# Patient Record
Sex: FEMALE | Race: WHITE | NOT HISPANIC OR LATINO | Employment: FULL TIME | URBAN - METROPOLITAN AREA
[De-identification: names, ages, dates, MRNs, and addresses within clinical notes are randomized per-mention and may not be internally consistent; named-entity substitution may affect disease eponyms.]

---
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
## 2024-01-09 ENCOUNTER — HOSPITAL ENCOUNTER (OUTPATIENT)
Facility: HOSPITAL | Age: 19
Discharge: HOME | End: 2024-01-09
Attending: STUDENT IN AN ORGANIZED HEALTH CARE EDUCATION/TRAINING PROGRAM | Admitting: OBSTETRICS & GYNECOLOGY
Payer: MEDICAID

## 2024-01-09 VITALS — DIASTOLIC BLOOD PRESSURE: 99 MMHG | RESPIRATION RATE: 16 BRPM | HEART RATE: 95 BPM | SYSTOLIC BLOOD PRESSURE: 110 MMHG

## 2024-01-09 LAB
BACTERIA UR QL AUTO: ABNORMAL /HPF
BILIRUB UR QL STRIP: NEGATIVE
CLARITY UR: CLEAR
COLOR UR: YELLOW
GLUCOSE UR STRIP-MCNC: NEGATIVE MG/DL
HGB UR QL STRIP.AUTO: NEGATIVE
HYALINE CASTS UR QL AUTO: ABNORMAL /LPF
KETONES UR QL STRIP: NEGATIVE
LEUKOCYTE ESTERASE UR QL STRIP.AUTO: ABNORMAL
NITRITE UR QL STRIP: NEGATIVE
PH UR STRIP.AUTO: 7 [PH] (ref 5–8)
PROT UR QL STRIP: NEGATIVE
RBC # UR STRIP: ABNORMAL /HPF
REF LAB TEST METHOD: ABNORMAL
SP GR UR STRIP: <=1.005 (ref 1–1.03)
SQUAMOUS #/AREA URNS HPF: ABNORMAL /HPF
UROBILINOGEN UR QL STRIP: ABNORMAL
WBC # UR STRIP: ABNORMAL /HPF

## 2024-01-09 PROCEDURE — G0463 HOSPITAL OUTPT CLINIC VISIT: HCPCS

## 2024-01-09 PROCEDURE — 81001 URINALYSIS AUTO W/SCOPE: CPT | Performed by: OBSTETRICS & GYNECOLOGY

## 2024-01-09 RX ORDER — PRENATAL WITH FERROUS FUM AND FOLIC ACID 3080; 920; 120; 400; 22; 1.84; 3; 20; 10; 1; 12; 200; 27; 25; 2 [IU]/1; [IU]/1; MG/1; [IU]/1; MG/1; MG/1; MG/1; MG/1; MG/1; MG/1; UG/1; MG/1; MG/1; MG/1; MG/1
1 TABLET ORAL DAILY
COMMUNITY

## 2024-01-09 NOTE — H&P
"[facility]  Obstetric History and Physical    Chief Complaint   Patient presents with    Back Pain     HPI:      Patient is a 18 y.o. female  currently at 31w4d, who presents by EMS with lower back pain that started while she was at work tonight at [employer].   She thinks it was the way she was sitting, but the other girls at work \"freaked her out\" so she thought she should be seen.  She denies feeling contractions, denies vaginal leaking and bleeding.  +FM.   Denies N/V/F/C.   No urinary symptoms.         The following portions of the patients history were reviewed and updated as appropriate: current medications, allergies, past medical history, past surgical history, past family history, past social history and problem list .       Prenatal Information:   Maternal Prenatal Labs  Blood Type No results found for: \"ABO\"   Rh Status No results found for: \"RH\"   Antibody Screen No results found for: \"ABSCRN\"   Gonnorhea No results found for: \"GCCX\"   Chlamydia No results found for: \"CLAMYDCU\"   RPR No results found for: \"RPR\"   Syphilis Antibody No results found for: \"SYPHILIS\"   Rubella No results found for: \"RUBELLAIGGIN\"   Hepatitis B Surface Antigen No results found for: \"HEPBSAG\"   HIV-1 Antibody No results found for: \"LABHIV1\"   Hepatitis C Antibody No results found for: \"HEPCAB\"   Rapid Urin Drug Screen No results found for: \"AMPMETHU\", \"BARBITSCNUR\", \"LABBENZSCN\", \"LABMETHSCN\", \"LABOPIASCN\", \"THCURSCR\", \"COCAINEUR\", \"AMPHETSCREEN\", \"PROPOXSCN\", \"BUPRENORSCNU\", \"METAMPSCNUR\", \"OXYCODONESCN\", \"TRICYCLICSCN\"   Group B Strep Culture No results found for: \"GBSANTIGEN\"           External Prenatal Results       Pregnancy Outside Results - Transcribed From Office Records - See Scanned Records For Details       Test Value Date Time    ABO  O  23 1521    Rh  Positive  23 1521    Antibody Screen  Negative  23 1111       Negative  23 1521    Varicella IgG  188 index 23 1521    Rubella  " 2.39 index 23 1521    Hgb  12.1 g/dL 23 1111       13.0 g/dL 23 1521    Hct  35.9 % 23 1111       38.6 % 23 1521    Glucose Fasting GTT       Glucose Tolerance Test 1 hour       Glucose Tolerance Test 3 hour       Gonorrhea (discrete)       Chlamydia (discrete)       RPR       VDRL       Syphilis Antibody       HBsAg  Non-Reactive  23 1521    Herpes Simplex Virus PCR       Herpes Simplex VIrus Culture       HIV  Non-Reactive  23 1521    Hep C RNA Quant PCR       Hep C Antibody  Non-Reactive  23 1521    AFP       Group B Strep       GBS Susceptibility to Clindamycin       GBS Susceptibility to Erythromycin       Fetal Fibronectin       Genetic Testing, Maternal Blood                 Drug Screening       Test Value Date Time    Urine Drug Screen       Amphetamine Screen  Negative  23 1521    Barbiturate Screen  Negative  23 1521    Benzodiazepine Screen  Negative  23 1521    Methadone Screen  Negative  23 1521    Phencyclidine Screen  Negative  23 1521    Opiates Screen  Negative  23 1521    THC Screen  Negative  23 1521    Cocaine Screen       Propoxyphene Screen  Negative  23 1521    Buprenorphine Screen  Negative  23 1521    Methamphetamine Screen       Oxycodone Screen  Negative  23 1521    Tricyclic Antidepressants Screen  Negative  23 1521              Legend    ^: Historical                              Past OB History:     OB History    Para Term  AB Living   3 0 0 0 1 0   SAB IAB Ectopic Molar Multiple Live Births   1 0 0 0 0 0      # Outcome Date GA Lbr Frank/2nd Weight Sex Delivery Anes PTL Lv   3 Current            2 2022 4w0d          1                 Past Medical History: Past Medical History:   Diagnosis Date    Anxiety     Depression     Polycystic kidney disease       Past Surgical History History reviewed. No pertinent surgical history.   Family History: No family  history on file.   Social History:  reports that she has never smoked. She has never used smokeless tobacco.   reports that she does not currently use alcohol.   reports no history of drug use.        Review of Systems  Denies HA, CP, Shortness of air, muscle weakness,                                             and rashes      Objective     Vital Signs Range for the last 24 hours  Temperature: Temp:  [97.6 °F (36.4 °C)] (P) 97.6 °F (36.4 °C)   Temp Source: Temp src: (P) Oral   BP: BP: (110)/(99) 110/99   Pulse:     Respirations:     SPO2:     O2 Amount (l/min):     O2 Devices     Weight: Weight:  [82.1 kg (181 lb)] (P) 82.1 kg (181 lb)     Physical Examination: General appearance - alert, well appearing, and in no distress and oriented to person, place, and time  Chest - Breathing is unlaboured   Heart - Regular rate   Abdomen - soft, nontender, nondistended,   no rebound tenderness noted  No guarding.  Back:  No CVA tenderness.  Some mild point tenderness in lower midline at I/S joint   Extremities - pedal edema  - none       Fetal Heart Rate Assessment   Method:     Beats/min:     Baseline:  140s   Varibility:  mod   Accels:  y   Decels:  n   Tracing Category:  1     Uterine Assessment   Method:     Frequency (min):  None   Ctx Count in 10 min:     Duration:     Intensity:     Intensity by IUPC:     Resting Tone:     Resting Tone by IUPC:     Leeds Units:      NST                     Indication:  Lower back pain  Start time : 0150                 End time:  0215  15 x 15 accels x 2  Yes  No decels  Baseline   140s  Reactive NST - Yes     Laboratory Results:   Lab Results   Component Value Date    SQUAMEPIUA 3-6 (A) 01/09/2024    SPECGRAVUR <=1.005 01/09/2024    KETONESU Negative 01/09/2024    BLOODU Negative 01/09/2024    LEUKOCYTESUR Moderate (2+) (A) 01/09/2024    NITRITEU Negative 01/09/2024    RBCUA 0-2 01/09/2024    WBCUA 11-20 (A) 01/09/2024    BACTERIA Trace 01/09/2024            Assessment/plan  1.  Intrauterine pregnancy at 31w4d weeks gestation with reactive fetal status  2.  Lower back pain related to normal pregnancy  3.  Given education and reassurance  4. Questions answered  5.  D/C home.       Roman Cortes MD  1/9/2024  02:24 EST

## 2024-02-18 LAB — EXTERNAL GROUP B STREP ANTIGEN: POSITIVE

## 2024-03-03 ENCOUNTER — HOSPITAL ENCOUNTER (OUTPATIENT)
Dept: LABOR AND DELIVERY | Facility: HOSPITAL | Age: 19
Discharge: HOME | End: 2024-03-03
Payer: MEDICAID

## 2024-03-03 ENCOUNTER — HOSPITAL ENCOUNTER (INPATIENT)
Facility: HOSPITAL | Age: 19
LOS: 3 days | Discharge: HOME | End: 2024-03-06
Attending: STUDENT IN AN ORGANIZED HEALTH CARE EDUCATION/TRAINING PROGRAM | Admitting: STUDENT IN AN ORGANIZED HEALTH CARE EDUCATION/TRAINING PROGRAM
Payer: MEDICAID

## 2024-03-03 ENCOUNTER — PREP FOR SURGERY (OUTPATIENT)
Dept: OTHER | Facility: HOSPITAL | Age: 19
End: 2024-03-03
Payer: MEDICAID

## 2024-03-03 DIAGNOSIS — Z3A.39 39 WEEKS GESTATION OF PREGNANCY: Primary | ICD-10-CM

## 2024-03-03 DIAGNOSIS — Z3A.39 39 WEEKS GESTATION OF PREGNANCY: ICD-10-CM

## 2024-03-03 LAB
ABO GROUP BLD: NORMAL
BLD GP AB SCN SERPL QL: NEGATIVE
DEPRECATED RDW RBC AUTO: 44.4 FL (ref 37–54)
ERYTHROCYTE [DISTWIDTH] IN BLOOD BY AUTOMATED COUNT: 14.4 % (ref 12.3–15.4)
HCT VFR BLD AUTO: 36.8 % (ref 34–46.6)
HGB BLD-MCNC: 12.1 G/DL (ref 12–15.9)
MCH RBC QN AUTO: 28.2 PG (ref 26.6–33)
MCHC RBC AUTO-ENTMCNC: 32.9 G/DL (ref 31.5–35.7)
MCV RBC AUTO: 85.8 FL (ref 79–97)
PLATELET # BLD AUTO: 373 10*3/MM3 (ref 140–450)
PMV BLD AUTO: 9.2 FL (ref 6–12)
RBC # BLD AUTO: 4.29 10*6/MM3 (ref 3.77–5.28)
RH BLD: POSITIVE
T&S EXPIRATION DATE: NORMAL
WBC NRBC COR # BLD AUTO: 14 10*3/MM3 (ref 3.4–10.8)

## 2024-03-03 PROCEDURE — 85027 COMPLETE CBC AUTOMATED: CPT | Performed by: STUDENT IN AN ORGANIZED HEALTH CARE EDUCATION/TRAINING PROGRAM

## 2024-03-03 PROCEDURE — 86850 RBC ANTIBODY SCREEN: CPT | Performed by: STUDENT IN AN ORGANIZED HEALTH CARE EDUCATION/TRAINING PROGRAM

## 2024-03-03 PROCEDURE — 25810000003 LACTATED RINGERS PER 1000 ML: Performed by: STUDENT IN AN ORGANIZED HEALTH CARE EDUCATION/TRAINING PROGRAM

## 2024-03-03 PROCEDURE — 86901 BLOOD TYPING SEROLOGIC RH(D): CPT | Performed by: STUDENT IN AN ORGANIZED HEALTH CARE EDUCATION/TRAINING PROGRAM

## 2024-03-03 PROCEDURE — 59025 FETAL NON-STRESS TEST: CPT

## 2024-03-03 PROCEDURE — 25010000002 PENICILLIN G POTASSIUM PER 600000 UNITS: Performed by: STUDENT IN AN ORGANIZED HEALTH CARE EDUCATION/TRAINING PROGRAM

## 2024-03-03 PROCEDURE — 3E033VJ INTRODUCTION OF OTHER HORMONE INTO PERIPHERAL VEIN, PERCUTANEOUS APPROACH: ICD-10-PCS | Performed by: STUDENT IN AN ORGANIZED HEALTH CARE EDUCATION/TRAINING PROGRAM

## 2024-03-03 PROCEDURE — 86900 BLOOD TYPING SEROLOGIC ABO: CPT | Performed by: STUDENT IN AN ORGANIZED HEALTH CARE EDUCATION/TRAINING PROGRAM

## 2024-03-03 PROCEDURE — 86780 TREPONEMA PALLIDUM: CPT | Performed by: STUDENT IN AN ORGANIZED HEALTH CARE EDUCATION/TRAINING PROGRAM

## 2024-03-03 RX ORDER — SODIUM CHLORIDE 0.9 % (FLUSH) 0.9 %
10 SYRINGE (ML) INJECTION EVERY 12 HOURS SCHEDULED
Status: DISCONTINUED | OUTPATIENT
Start: 2024-03-03 | End: 2024-03-04 | Stop reason: HOSPADM

## 2024-03-03 RX ORDER — OXYTOCIN/0.9 % SODIUM CHLORIDE 30/500 ML
2-20 PLASTIC BAG, INJECTION (ML) INTRAVENOUS
Status: DISCONTINUED | OUTPATIENT
Start: 2024-03-03 | End: 2024-03-04 | Stop reason: HOSPADM

## 2024-03-03 RX ORDER — PROMETHAZINE HYDROCHLORIDE 12.5 MG/1
12.5 TABLET ORAL EVERY 6 HOURS PRN
Status: CANCELLED | OUTPATIENT
Start: 2024-03-03

## 2024-03-03 RX ORDER — MAGNESIUM CARB/ALUMINUM HYDROX 105-160MG
30 TABLET,CHEWABLE ORAL ONCE
Status: DISCONTINUED | OUTPATIENT
Start: 2024-03-03 | End: 2024-03-04 | Stop reason: HOSPADM

## 2024-03-03 RX ORDER — TERBUTALINE SULFATE 1 MG/ML
0.25 INJECTION, SOLUTION SUBCUTANEOUS AS NEEDED
Status: CANCELLED | OUTPATIENT
Start: 2024-03-03

## 2024-03-03 RX ORDER — ACETAMINOPHEN 325 MG/1
650 TABLET ORAL EVERY 4 HOURS PRN
Status: CANCELLED | OUTPATIENT
Start: 2024-03-03

## 2024-03-03 RX ORDER — MORPHINE SULFATE 2 MG/ML
2 INJECTION, SOLUTION INTRAMUSCULAR; INTRAVENOUS EVERY 4 HOURS PRN
Status: DISCONTINUED | OUTPATIENT
Start: 2024-03-03 | End: 2024-03-04

## 2024-03-03 RX ORDER — NALOXONE HCL 0.4 MG/ML
0.4 VIAL (ML) INJECTION
Status: CANCELLED | OUTPATIENT
Start: 2024-03-03

## 2024-03-03 RX ORDER — SODIUM CHLORIDE 9 MG/ML
40 INJECTION, SOLUTION INTRAVENOUS AS NEEDED
Status: DISCONTINUED | OUTPATIENT
Start: 2024-03-03 | End: 2024-03-04 | Stop reason: HOSPADM

## 2024-03-03 RX ORDER — LIDOCAINE HYDROCHLORIDE 10 MG/ML
0.5 INJECTION, SOLUTION EPIDURAL; INFILTRATION; INTRACAUDAL; PERINEURAL ONCE AS NEEDED
Status: CANCELLED | OUTPATIENT
Start: 2024-03-03

## 2024-03-03 RX ORDER — MAGNESIUM CARB/ALUMINUM HYDROX 105-160MG
30 TABLET,CHEWABLE ORAL ONCE
Status: CANCELLED | OUTPATIENT
Start: 2024-03-03 | End: 2024-03-03

## 2024-03-03 RX ORDER — MORPHINE SULFATE 1 MG/ML
1 INJECTION, SOLUTION EPIDURAL; INTRATHECAL; INTRAVENOUS EVERY 4 HOURS PRN
Status: CANCELLED | OUTPATIENT
Start: 2024-03-03 | End: 2024-03-08

## 2024-03-03 RX ORDER — MORPHINE SULFATE 2 MG/ML
2 INJECTION, SOLUTION INTRAMUSCULAR; INTRAVENOUS EVERY 4 HOURS PRN
Status: CANCELLED | OUTPATIENT
Start: 2024-03-03 | End: 2024-03-08

## 2024-03-03 RX ORDER — PENICILLIN G 3000000 [IU]/50ML
3 INJECTION, SOLUTION INTRAVENOUS EVERY 4 HOURS
Status: DISCONTINUED | OUTPATIENT
Start: 2024-03-03 | End: 2024-03-04 | Stop reason: HOSPADM

## 2024-03-03 RX ORDER — OXYTOCIN/0.9 % SODIUM CHLORIDE 30/500 ML
30 PLASTIC BAG, INJECTION (ML) INTRAVENOUS ONCE
Status: CANCELLED | OUTPATIENT
Start: 2024-03-03 | End: 2024-03-03

## 2024-03-03 RX ORDER — PROMETHAZINE HYDROCHLORIDE 12.5 MG/1
12.5 SUPPOSITORY RECTAL EVERY 6 HOURS PRN
Status: CANCELLED | OUTPATIENT
Start: 2024-03-03

## 2024-03-03 RX ORDER — PROMETHAZINE HYDROCHLORIDE 12.5 MG/1
12.5 SUPPOSITORY RECTAL EVERY 6 HOURS PRN
Status: DISCONTINUED | OUTPATIENT
Start: 2024-03-03 | End: 2024-03-04 | Stop reason: HOSPADM

## 2024-03-03 RX ORDER — SODIUM CHLORIDE 0.9 % (FLUSH) 0.9 %
10 SYRINGE (ML) INJECTION EVERY 12 HOURS SCHEDULED
Status: CANCELLED | OUTPATIENT
Start: 2024-03-03

## 2024-03-03 RX ORDER — ONDANSETRON 2 MG/ML
4 INJECTION INTRAMUSCULAR; INTRAVENOUS EVERY 6 HOURS PRN
Status: CANCELLED | OUTPATIENT
Start: 2024-03-03

## 2024-03-03 RX ORDER — PROMETHAZINE HYDROCHLORIDE 12.5 MG/1
12.5 TABLET ORAL EVERY 6 HOURS PRN
Status: DISCONTINUED | OUTPATIENT
Start: 2024-03-03 | End: 2024-03-04 | Stop reason: HOSPADM

## 2024-03-03 RX ORDER — LIDOCAINE HYDROCHLORIDE 10 MG/ML
0.5 INJECTION, SOLUTION EPIDURAL; INFILTRATION; INTRACAUDAL; PERINEURAL ONCE AS NEEDED
Status: DISCONTINUED | OUTPATIENT
Start: 2024-03-03 | End: 2024-03-04 | Stop reason: HOSPADM

## 2024-03-03 RX ORDER — ONDANSETRON 4 MG/1
4 TABLET, ORALLY DISINTEGRATING ORAL EVERY 6 HOURS PRN
Status: DISCONTINUED | OUTPATIENT
Start: 2024-03-03 | End: 2024-03-04 | Stop reason: HOSPADM

## 2024-03-03 RX ORDER — SODIUM CHLORIDE, SODIUM LACTATE, POTASSIUM CHLORIDE, CALCIUM CHLORIDE 600; 310; 30; 20 MG/100ML; MG/100ML; MG/100ML; MG/100ML
125 INJECTION, SOLUTION INTRAVENOUS CONTINUOUS
Status: CANCELLED | OUTPATIENT
Start: 2024-03-03

## 2024-03-03 RX ORDER — METHYLERGONOVINE MALEATE 0.2 MG/ML
200 INJECTION INTRAVENOUS ONCE AS NEEDED
Status: CANCELLED | OUTPATIENT
Start: 2024-03-03

## 2024-03-03 RX ORDER — ONDANSETRON 4 MG/1
4 TABLET, ORALLY DISINTEGRATING ORAL EVERY 6 HOURS PRN
Status: CANCELLED | OUTPATIENT
Start: 2024-03-03

## 2024-03-03 RX ORDER — MORPHINE SULFATE 2 MG/ML
1 INJECTION, SOLUTION INTRAMUSCULAR; INTRAVENOUS EVERY 4 HOURS PRN
Status: DISCONTINUED | OUTPATIENT
Start: 2024-03-03 | End: 2024-03-04

## 2024-03-03 RX ORDER — SODIUM CHLORIDE, SODIUM LACTATE, POTASSIUM CHLORIDE, CALCIUM CHLORIDE 600; 310; 30; 20 MG/100ML; MG/100ML; MG/100ML; MG/100ML
125 INJECTION, SOLUTION INTRAVENOUS CONTINUOUS
Status: DISCONTINUED | OUTPATIENT
Start: 2024-03-03 | End: 2024-03-06 | Stop reason: HOSPADM

## 2024-03-03 RX ORDER — OXYTOCIN/0.9 % SODIUM CHLORIDE 30/500 ML
30 PLASTIC BAG, INJECTION (ML) INTRAVENOUS CONTINUOUS
Status: CANCELLED | OUTPATIENT
Start: 2024-03-03 | End: 2024-03-03

## 2024-03-03 RX ORDER — CARBOPROST TROMETHAMINE 250 UG/ML
250 INJECTION, SOLUTION INTRAMUSCULAR AS NEEDED
Status: CANCELLED | OUTPATIENT
Start: 2024-03-03

## 2024-03-03 RX ORDER — NALOXONE HCL 0.4 MG/ML
0.4 VIAL (ML) INJECTION
Status: DISCONTINUED | OUTPATIENT
Start: 2024-03-03 | End: 2024-03-04

## 2024-03-03 RX ORDER — SODIUM CHLORIDE 9 MG/ML
40 INJECTION, SOLUTION INTRAVENOUS AS NEEDED
Status: CANCELLED | OUTPATIENT
Start: 2024-03-03

## 2024-03-03 RX ORDER — OXYTOCIN/0.9 % SODIUM CHLORIDE 30/500 ML
2-20 PLASTIC BAG, INJECTION (ML) INTRAVENOUS
Status: CANCELLED | OUTPATIENT
Start: 2024-03-03

## 2024-03-03 RX ORDER — ONDANSETRON 2 MG/ML
4 INJECTION INTRAMUSCULAR; INTRAVENOUS EVERY 6 HOURS PRN
Status: DISCONTINUED | OUTPATIENT
Start: 2024-03-03 | End: 2024-03-04 | Stop reason: HOSPADM

## 2024-03-03 RX ORDER — PENICILLIN G 3000000 [IU]/50ML
3 INJECTION, SOLUTION INTRAVENOUS EVERY 4 HOURS
Status: CANCELLED | OUTPATIENT
Start: 2024-03-03 | End: 2024-03-04

## 2024-03-03 RX ORDER — MISOPROSTOL 200 UG/1
800 TABLET ORAL AS NEEDED
Status: CANCELLED | OUTPATIENT
Start: 2024-03-03

## 2024-03-03 RX ORDER — SODIUM CHLORIDE 0.9 % (FLUSH) 0.9 %
10 SYRINGE (ML) INJECTION AS NEEDED
Status: DISCONTINUED | OUTPATIENT
Start: 2024-03-03 | End: 2024-03-04 | Stop reason: HOSPADM

## 2024-03-03 RX ORDER — SODIUM CHLORIDE 0.9 % (FLUSH) 0.9 %
10 SYRINGE (ML) INJECTION AS NEEDED
Status: CANCELLED | OUTPATIENT
Start: 2024-03-03

## 2024-03-03 RX ORDER — TERBUTALINE SULFATE 1 MG/ML
0.25 INJECTION, SOLUTION SUBCUTANEOUS AS NEEDED
Status: DISCONTINUED | OUTPATIENT
Start: 2024-03-03 | End: 2024-03-04 | Stop reason: HOSPADM

## 2024-03-03 RX ORDER — ACETAMINOPHEN 325 MG/1
650 TABLET ORAL EVERY 4 HOURS PRN
Status: DISCONTINUED | OUTPATIENT
Start: 2024-03-03 | End: 2024-03-04 | Stop reason: HOSPADM

## 2024-03-03 RX ADMIN — SODIUM CHLORIDE 5 MILLION UNITS: 900 INJECTION INTRAVENOUS at 18:30

## 2024-03-03 RX ADMIN — PENICILLIN G 3 MILLION UNITS: 3000000 INJECTION, SOLUTION INTRAVENOUS at 21:52

## 2024-03-03 RX ADMIN — Medication 2 MILLI-UNITS/MIN: at 18:45

## 2024-03-03 RX ADMIN — SODIUM CHLORIDE, POTASSIUM CHLORIDE, SODIUM LACTATE AND CALCIUM CHLORIDE 125 ML/HR: 600; 310; 30; 20 INJECTION, SOLUTION INTRAVENOUS at 18:30

## 2024-03-04 ENCOUNTER — ANESTHESIA EVENT (OUTPATIENT)
Dept: LABOR AND DELIVERY | Facility: HOSPITAL | Age: 19
End: 2024-03-04
Payer: MEDICAID

## 2024-03-04 ENCOUNTER — ANESTHESIA (OUTPATIENT)
Dept: LABOR AND DELIVERY | Facility: HOSPITAL | Age: 19
End: 2024-03-04
Payer: MEDICAID

## 2024-03-04 LAB — T PALLIDUM IGG SER QL: NORMAL

## 2024-03-04 PROCEDURE — 25010000002 PENICILLIN G POTASSIUM PER 600000 UNITS: Performed by: STUDENT IN AN ORGANIZED HEALTH CARE EDUCATION/TRAINING PROGRAM

## 2024-03-04 PROCEDURE — C1755 CATHETER, INTRASPINAL: HCPCS | Performed by: ANESTHESIOLOGY

## 2024-03-04 PROCEDURE — 25010000002 ROPIVACAINE PER 1 MG: Performed by: NURSE ANESTHETIST, CERTIFIED REGISTERED

## 2024-03-04 PROCEDURE — 59025 FETAL NON-STRESS TEST: CPT

## 2024-03-04 PROCEDURE — 51702 INSERT TEMP BLADDER CATH: CPT

## 2024-03-04 PROCEDURE — 10907ZC DRAINAGE OF AMNIOTIC FLUID, THERAPEUTIC FROM PRODUCTS OF CONCEPTION, VIA NATURAL OR ARTIFICIAL OPENING: ICD-10-PCS | Performed by: STUDENT IN AN ORGANIZED HEALTH CARE EDUCATION/TRAINING PROGRAM

## 2024-03-04 PROCEDURE — 25010000002 FENTANYL CITRATE (PF) 50 MCG/ML SOLUTION: Performed by: NURSE ANESTHETIST, CERTIFIED REGISTERED

## 2024-03-04 PROCEDURE — C1755 CATHETER, INTRASPINAL: HCPCS

## 2024-03-04 PROCEDURE — 25010000002 ONDANSETRON PER 1 MG: Performed by: STUDENT IN AN ORGANIZED HEALTH CARE EDUCATION/TRAINING PROGRAM

## 2024-03-04 PROCEDURE — 25810000003 LACTATED RINGERS SOLUTION: Performed by: NURSE ANESTHETIST, CERTIFIED REGISTERED

## 2024-03-04 PROCEDURE — 25810000003 LACTATED RINGERS PER 1000 ML: Performed by: STUDENT IN AN ORGANIZED HEALTH CARE EDUCATION/TRAINING PROGRAM

## 2024-03-04 PROCEDURE — 0KQM0ZZ REPAIR PERINEUM MUSCLE, OPEN APPROACH: ICD-10-PCS | Performed by: STUDENT IN AN ORGANIZED HEALTH CARE EDUCATION/TRAINING PROGRAM

## 2024-03-04 RX ORDER — DIPHENHYDRAMINE HYDROCHLORIDE 50 MG/ML
12.5 INJECTION INTRAMUSCULAR; INTRAVENOUS EVERY 8 HOURS PRN
Status: DISCONTINUED | OUTPATIENT
Start: 2024-03-04 | End: 2024-03-04 | Stop reason: HOSPADM

## 2024-03-04 RX ORDER — METHYLERGONOVINE MALEATE 0.2 MG/ML
200 INJECTION INTRAVENOUS ONCE AS NEEDED
Status: DISCONTINUED | OUTPATIENT
Start: 2024-03-04 | End: 2024-03-04 | Stop reason: HOSPADM

## 2024-03-04 RX ORDER — FENTANYL CITRATE 50 UG/ML
50 INJECTION, SOLUTION INTRAMUSCULAR; INTRAVENOUS
Status: DISCONTINUED | OUTPATIENT
Start: 2024-03-04 | End: 2024-03-06 | Stop reason: HOSPADM

## 2024-03-04 RX ORDER — SODIUM CHLORIDE 0.9 % (FLUSH) 0.9 %
1-10 SYRINGE (ML) INJECTION AS NEEDED
Status: DISCONTINUED | OUTPATIENT
Start: 2024-03-04 | End: 2024-03-06 | Stop reason: HOSPADM

## 2024-03-04 RX ORDER — IBUPROFEN 600 MG/1
600 TABLET ORAL EVERY 6 HOURS PRN
Status: DISCONTINUED | OUTPATIENT
Start: 2024-03-04 | End: 2024-03-06 | Stop reason: HOSPADM

## 2024-03-04 RX ORDER — MISOPROSTOL 200 UG/1
800 TABLET ORAL AS NEEDED
Status: DISCONTINUED | OUTPATIENT
Start: 2024-03-04 | End: 2024-03-06 | Stop reason: HOSPADM

## 2024-03-04 RX ORDER — EPHEDRINE SULFATE 5 MG/ML
10 INJECTION INTRAVENOUS
Status: DISCONTINUED | OUTPATIENT
Start: 2024-03-04 | End: 2024-03-04 | Stop reason: HOSPADM

## 2024-03-04 RX ORDER — CITRIC ACID/SODIUM CITRATE 334-500MG
30 SOLUTION, ORAL ORAL ONCE
Status: DISCONTINUED | OUTPATIENT
Start: 2024-03-04 | End: 2024-03-04 | Stop reason: HOSPADM

## 2024-03-04 RX ORDER — LIDOCAINE HCL/EPINEPHRINE/PF 2%-1:200K
VIAL (ML) INJECTION AS NEEDED
Status: DISCONTINUED | OUTPATIENT
Start: 2024-03-04 | End: 2024-03-04 | Stop reason: SURG

## 2024-03-04 RX ORDER — PROMETHAZINE HYDROCHLORIDE 12.5 MG/1
12.5 TABLET ORAL EVERY 4 HOURS PRN
Status: DISCONTINUED | OUTPATIENT
Start: 2024-03-04 | End: 2024-03-06 | Stop reason: HOSPADM

## 2024-03-04 RX ORDER — METHYLERGONOVINE MALEATE 0.2 MG/ML
200 INJECTION INTRAVENOUS ONCE AS NEEDED
Status: DISCONTINUED | OUTPATIENT
Start: 2024-03-04 | End: 2024-03-06 | Stop reason: HOSPADM

## 2024-03-04 RX ORDER — DIPHENHYDRAMINE HCL 25 MG
25 CAPSULE ORAL NIGHTLY PRN
Status: DISCONTINUED | OUTPATIENT
Start: 2024-03-04 | End: 2024-03-06 | Stop reason: HOSPADM

## 2024-03-04 RX ORDER — HYDROCORTISONE 25 MG/G
1 CREAM TOPICAL AS NEEDED
Status: DISCONTINUED | OUTPATIENT
Start: 2024-03-04 | End: 2024-03-06 | Stop reason: HOSPADM

## 2024-03-04 RX ORDER — ONDANSETRON 2 MG/ML
4 INJECTION INTRAMUSCULAR; INTRAVENOUS EVERY 6 HOURS PRN
Status: DISCONTINUED | OUTPATIENT
Start: 2024-03-04 | End: 2024-03-04 | Stop reason: HOSPADM

## 2024-03-04 RX ORDER — ONDANSETRON 2 MG/ML
4 INJECTION INTRAMUSCULAR; INTRAVENOUS ONCE AS NEEDED
Status: DISCONTINUED | OUTPATIENT
Start: 2024-03-04 | End: 2024-03-04 | Stop reason: HOSPADM

## 2024-03-04 RX ORDER — CALCIUM CARBONATE 500 MG/1
1 TABLET, CHEWABLE ORAL 3 TIMES DAILY PRN
Status: DISCONTINUED | OUTPATIENT
Start: 2024-03-04 | End: 2024-03-06 | Stop reason: HOSPADM

## 2024-03-04 RX ORDER — METOCLOPRAMIDE HYDROCHLORIDE 5 MG/ML
10 INJECTION INTRAMUSCULAR; INTRAVENOUS ONCE AS NEEDED
Status: DISCONTINUED | OUTPATIENT
Start: 2024-03-04 | End: 2024-03-04 | Stop reason: HOSPADM

## 2024-03-04 RX ORDER — FENTANYL CITRATE 50 UG/ML
INJECTION, SOLUTION INTRAMUSCULAR; INTRAVENOUS AS NEEDED
Status: DISCONTINUED | OUTPATIENT
Start: 2024-03-04 | End: 2024-03-04 | Stop reason: SURG

## 2024-03-04 RX ORDER — CARBOPROST TROMETHAMINE 250 UG/ML
250 INJECTION, SOLUTION INTRAMUSCULAR AS NEEDED
Status: DISCONTINUED | OUTPATIENT
Start: 2024-03-04 | End: 2024-03-04 | Stop reason: HOSPADM

## 2024-03-04 RX ORDER — ONDANSETRON 4 MG/1
4 TABLET, ORALLY DISINTEGRATING ORAL EVERY 8 HOURS PRN
Status: DISCONTINUED | OUTPATIENT
Start: 2024-03-04 | End: 2024-03-06 | Stop reason: HOSPADM

## 2024-03-04 RX ORDER — HYDROCODONE BITARTRATE AND ACETAMINOPHEN 5; 325 MG/1; MG/1
1 TABLET ORAL EVERY 4 HOURS PRN
Status: DISCONTINUED | OUTPATIENT
Start: 2024-03-04 | End: 2024-03-06 | Stop reason: HOSPADM

## 2024-03-04 RX ORDER — ACETAMINOPHEN 325 MG/1
650 TABLET ORAL EVERY 6 HOURS PRN
Status: DISCONTINUED | OUTPATIENT
Start: 2024-03-04 | End: 2024-03-06 | Stop reason: HOSPADM

## 2024-03-04 RX ORDER — LIDOCAINE HYDROCHLORIDE AND EPINEPHRINE 15; 5 MG/ML; UG/ML
INJECTION, SOLUTION EPIDURAL AS NEEDED
Status: DISCONTINUED | OUTPATIENT
Start: 2024-03-04 | End: 2024-03-04 | Stop reason: SURG

## 2024-03-04 RX ORDER — OXYTOCIN/0.9 % SODIUM CHLORIDE 30/500 ML
125 PLASTIC BAG, INJECTION (ML) INTRAVENOUS CONTINUOUS PRN
Status: DISCONTINUED | OUTPATIENT
Start: 2024-03-04 | End: 2024-03-06 | Stop reason: HOSPADM

## 2024-03-04 RX ORDER — OXYTOCIN/0.9 % SODIUM CHLORIDE 30/500 ML
250 PLASTIC BAG, INJECTION (ML) INTRAVENOUS CONTINUOUS
Status: DISPENSED | OUTPATIENT
Start: 2024-03-04 | End: 2024-03-04

## 2024-03-04 RX ORDER — ONDANSETRON 2 MG/ML
4 INJECTION INTRAMUSCULAR; INTRAVENOUS EVERY 6 HOURS PRN
Status: DISCONTINUED | OUTPATIENT
Start: 2024-03-04 | End: 2024-03-06 | Stop reason: HOSPADM

## 2024-03-04 RX ORDER — PRENATAL VIT/IRON FUM/FOLIC AC 27MG-0.8MG
1 TABLET ORAL DAILY
Status: DISCONTINUED | OUTPATIENT
Start: 2024-03-04 | End: 2024-03-06 | Stop reason: HOSPADM

## 2024-03-04 RX ORDER — HYDROCODONE BITARTRATE AND ACETAMINOPHEN 10; 325 MG/1; MG/1
1 TABLET ORAL EVERY 4 HOURS PRN
Status: DISCONTINUED | OUTPATIENT
Start: 2024-03-04 | End: 2024-03-06 | Stop reason: HOSPADM

## 2024-03-04 RX ORDER — ONDANSETRON 4 MG/1
4 TABLET, ORALLY DISINTEGRATING ORAL EVERY 6 HOURS PRN
Status: DISCONTINUED | OUTPATIENT
Start: 2024-03-04 | End: 2024-03-04 | Stop reason: HOSPADM

## 2024-03-04 RX ORDER — DOCUSATE SODIUM 100 MG/1
100 CAPSULE, LIQUID FILLED ORAL 2 TIMES DAILY
Status: DISCONTINUED | OUTPATIENT
Start: 2024-03-04 | End: 2024-03-06 | Stop reason: HOSPADM

## 2024-03-04 RX ORDER — FAMOTIDINE 10 MG/ML
20 INJECTION, SOLUTION INTRAVENOUS ONCE AS NEEDED
Status: DISCONTINUED | OUTPATIENT
Start: 2024-03-04 | End: 2024-03-04 | Stop reason: HOSPADM

## 2024-03-04 RX ORDER — CARBOPROST TROMETHAMINE 250 UG/ML
250 INJECTION, SOLUTION INTRAMUSCULAR AS NEEDED
Status: DISCONTINUED | OUTPATIENT
Start: 2024-03-04 | End: 2024-03-06 | Stop reason: HOSPADM

## 2024-03-04 RX ORDER — OXYTOCIN/0.9 % SODIUM CHLORIDE 30/500 ML
999 PLASTIC BAG, INJECTION (ML) INTRAVENOUS ONCE
Status: DISCONTINUED | OUTPATIENT
Start: 2024-03-04 | End: 2024-03-04 | Stop reason: HOSPADM

## 2024-03-04 RX ORDER — ROPIVACAINE HYDROCHLORIDE 2 MG/ML
15 INJECTION, SOLUTION EPIDURAL; INFILTRATION; PERINEURAL CONTINUOUS
Status: DISCONTINUED | OUTPATIENT
Start: 2024-03-04 | End: 2024-03-06 | Stop reason: HOSPADM

## 2024-03-04 RX ORDER — MISOPROSTOL 200 UG/1
800 TABLET ORAL AS NEEDED
Status: DISCONTINUED | OUTPATIENT
Start: 2024-03-04 | End: 2024-03-04 | Stop reason: HOSPADM

## 2024-03-04 RX ORDER — SIMETHICONE 80 MG
80 TABLET,CHEWABLE ORAL 4 TIMES DAILY PRN
Status: DISCONTINUED | OUTPATIENT
Start: 2024-03-04 | End: 2024-03-06 | Stop reason: HOSPADM

## 2024-03-04 RX ADMIN — Medication: at 20:10

## 2024-03-04 RX ADMIN — SODIUM CHLORIDE, POTASSIUM CHLORIDE, SODIUM LACTATE AND CALCIUM CHLORIDE 125 ML/HR: 600; 310; 30; 20 INJECTION, SOLUTION INTRAVENOUS at 08:31

## 2024-03-04 RX ADMIN — LIDOCAINE HYDROCHLORIDE AND EPINEPHRINE 3 ML: 15; 5 INJECTION, SOLUTION EPIDURAL at 08:20

## 2024-03-04 RX ADMIN — SODIUM CHLORIDE, POTASSIUM CHLORIDE, SODIUM LACTATE AND CALCIUM CHLORIDE 125 ML/HR: 600; 310; 30; 20 INJECTION, SOLUTION INTRAVENOUS at 01:56

## 2024-03-04 RX ADMIN — PENICILLIN G 3 MILLION UNITS: 3000000 INJECTION, SOLUTION INTRAVENOUS at 05:42

## 2024-03-04 RX ADMIN — ROPIVACAINE HYDROCHLORIDE 10 ML: 5 INJECTION, SOLUTION EPIDURAL; INFILTRATION; PERINEURAL at 08:27

## 2024-03-04 RX ADMIN — SODIUM CHLORIDE, POTASSIUM CHLORIDE, SODIUM LACTATE AND CALCIUM CHLORIDE 1000 ML: 600; 310; 30; 20 INJECTION, SOLUTION INTRAVENOUS at 07:50

## 2024-03-04 RX ADMIN — DOCUSATE SODIUM 100 MG: 100 CAPSULE, LIQUID FILLED ORAL at 20:10

## 2024-03-04 RX ADMIN — PENICILLIN G 3 MILLION UNITS: 3000000 INJECTION, SOLUTION INTRAVENOUS at 01:52

## 2024-03-04 RX ADMIN — PENICILLIN G 3 MILLION UNITS: 3000000 INJECTION, SOLUTION INTRAVENOUS at 10:24

## 2024-03-04 RX ADMIN — ONDANSETRON 4 MG: 2 INJECTION INTRAMUSCULAR; INTRAVENOUS at 05:14

## 2024-03-04 RX ADMIN — SODIUM CHLORIDE, POTASSIUM CHLORIDE, SODIUM LACTATE AND CALCIUM CHLORIDE 125 ML/HR: 600; 310; 30; 20 INJECTION, SOLUTION INTRAVENOUS at 12:06

## 2024-03-04 RX ADMIN — LIDOCAINE HYDROCHLORIDE AND EPINEPHRINE 6 ML: 20; 5 INJECTION, SOLUTION EPIDURAL; INFILTRATION; INTRACAUDAL; PERINEURAL at 10:04

## 2024-03-04 RX ADMIN — FENTANYL CITRATE 100 MCG: 50 INJECTION, SOLUTION INTRAMUSCULAR; INTRAVENOUS at 08:23

## 2024-03-04 RX ADMIN — ROPIVACAINE HYDROCHLORIDE 15 ML/HR: 2 INJECTION, SOLUTION EPIDURAL; INFILTRATION at 08:28

## 2024-03-04 RX ADMIN — WITCH HAZEL: 500 SOLUTION RECTAL; TOPICAL at 20:10

## 2024-03-04 RX ADMIN — PRENATAL VITAMINS-IRON FUMARATE 27 MG IRON-FOLIC ACID 0.8 MG TABLET 1 TABLET: at 20:10

## 2024-03-04 RX ADMIN — IBUPROFEN 600 MG: 600 TABLET, FILM COATED ORAL at 20:10

## 2024-03-04 RX ADMIN — Medication 250 ML/HR: at 16:18

## 2024-03-04 NOTE — PROGRESS NOTES
03/04/24  11:11 EST  Antonia Jurado    SUBJECTIVE: comfortable     ASSESSMENTS:     /81   Pulse 100   Temp 98.3 °F (36.8 °C) (Oral)   Resp (P) 18   Breastfeeding Yes     Fetal Heart Rate Assessment   Method: Fetal HR Assessment Method: external   Beats/min: Fetal HR (beats/min): 120   Baseline: Fetal HR Baseline: normal range   Varibility: Fetal HR Variability: moderate (amplitude range 6 to 25 bpm)   Accels: Fetal HR Accelerations: greater than/equal to 15 bpm   Decels: Fetal HR Decelerations: absent   Tracing Category:       Uterine Assessment   Method: Method: external tocotransducer   Frequency (min): Contraction Frequency (Minutes): 2-3   Ctx Count in 10 min: Contractions in 10 Minutes: 2-3   Duration:     Intensity: Contraction Intensity: moderate by palpation   Intensity by IUPC:     Resting Tone: Uterine Resting Tone: soft by palpation   Resting Tone by IUPC:       Presentation: cephalice   Cervix: Exam by: Method: (P) sterile exam per RN   Dilation: Cervical Dilation (cm): (P) 5-6   Effacement: Cervical Effacement: (P) 90%   Station: Fetal Station: (P) -1            IUP at 39w3d   GBS positive          PLAN:  -Comfortable  -SVE 4/90/-1  -PCN IV for GBS ppx  -AROM performed with return of clear fluid. Fetal and maternal tolerance of procedure.   -FHT Cat I  -Proceed with IV PPP    Lisa Gifford DO  11:11 EST  03/04/24

## 2024-03-04 NOTE — ANESTHESIA PREPROCEDURE EVALUATION
Anesthesia Evaluation     Patient summary reviewed and Nursing notes reviewed   NPO Solid Status: > 6 hours  NPO Liquid Status: < 2 hours           Airway   Mallampati: II  TM distance: >3 FB  Neck ROM: full  Dental      Pulmonary - negative pulmonary ROS   Cardiovascular - negative cardio ROS        Neuro/Psych  (+) psychiatric history Anxiety and Depression  GI/Hepatic/Renal/Endo    (+) renal disease (polycystic kidney disease)-    Musculoskeletal (-) negative ROS    Abdominal    Substance History - negative use     OB/GYN    (+) Pregnant        Other - negative ROS                   Anesthesia Plan    ASA 2     epidural       Anesthetic plan, risks, benefits, and alternatives have been provided, discussed and informed consent has been obtained with: patient.    Plan discussed with attending.    CODE STATUS:    Level Of Support Discussed With: Patient  Code Status (Patient has no pulse and is not breathing): CPR (Attempt to Resuscitate)  Medical Interventions (Patient has pulse or is breathing): Full Support

## 2024-03-04 NOTE — L&D DELIVERY NOTE
[facility]   Vaginal Delivery Note    Patient Name: Antonia Jurado  : 2005  MRN: [id number]    Date of Delivery: 3/4/2024     Diagnosis     Pre & Post-Delivery:  Intrauterine pregnancy at 39w3d  Labor status: Induced Onset of Labor     Pregnancy with 39 completed weeks gestation             Problem List    Transfer to Postpartum     Review the Delivery Report for details.     Delivery     Delivery:      YOB: 2025    Time of Birth:  Gestational Age 3:37 PM   39w3d     Anesthesia: Epidural     Delivering clinician:     Forceps?   No   Vacuum? No    Shoulder dystocia present: No        Delivery narrative:  Patient admitted to L&D for [facility]. Connected to external FHT and TOCO on presentation to unit. SVE on intake confirmed that she was 4/80/-1. Patient was heather approximately every 5 minutes. FHT demonstrated initial Category I findings. IV Pitocin per protocol initiated. Patient received epidural and was comfortable with contractions. AROM with amnihook performed with return of clear fluid. Maternal and fetal tolerance of procedure.     Labor progressed steadily. Patient felt increasingly more painful with contractions. Patient progressed to complete and felt the urge to push. Patient was placed in dorsal lithotomy position with feet in stirrups bilaterally. Patient was instructed on pushing in concert with contractions. Patient delivered infant vertex in PABLO position. No nuchal cord was present. Anterior left shoulder delivered by gentle downward traction then followed by right posterior shoulder by gentle upward traction without complication. No shoulder dystocia. Body followed without difficulty. Male infant delivered pink, active, with good tone and was placed on maternal abdomen. Delayed cord clamping was performed for 1 minute while infant was stimulated and vigorous cry noted. Umbilical cord was doubly clamped and cut by friend of patient. Infant was attended to by labor  "and delivery and nursery nurse. Umbilical cord blood collected. Placenta delivered intact spontaneously without complication via gentle umbilical cord traction. IV Pitocin was started. Fundal massage was initiated and fundus was found to be firm. Blood and clots were cleared from the vaginal vault. The perineum, vaginal walls, cervix, and paraurethral area were inspected thoroughly. A second degree midline perineal laceration was noted. Laceration reapproximated with 3-0 Vicryl. Epidural was effective for patient comfort. Hemostasis noted. No episiotomy was performed. Placenta was observed following delivery. Cotyledons all intact and membranes complete. Thick 3 vessel cord confirmed. No complications. Mother and baby were stable  when leaving delivery room.     I was present for all portions of delivery as listed above.        Infant     Findings: male  infant     Infant observations: Weight: 3690 g (8 lb 2.2 oz)   Length: 20  in  Observations/Comments:        Apgars:   @ 1 minute /      @ 5 minutes   Infant Name: Jesus     Placenta & Cord         Placenta delivered  Spontaneous  at   3/4/2024  3:41 PM     Cord:   present.   Nuchal Cord?  no   Cord blood obtained:     Cord gases obtained:      Cord gas results: Venous:  No results found for: \"PHCVEN\", \"BECVEN\"    Arterial:  No results found for: \"PHCART\", \"BECART\"     Repair     Episiotomy: Not recorded     No    Lacerations: Yes  Laceration Information  Laceration Repaired?   Perineal:    yes   Periurethral:       Labial:       Sulcus:       Vaginal:       Cervical:         Suture used for repair: 3-0 Vicryl     Estimated Blood Loss:  250ml     Quantitative Blood Loss:          Complications     none    Disposition     Mother to Mother Baby/Postpartum  in stable condition currently.  Baby to NBN  in stable condition currently.    Lisa Gifford DO  03/04/24  16:05 EST        "

## 2024-03-04 NOTE — PROGRESS NOTES
03/04/24  12:37 EST  Antonia Jurado    SUBJECTIVE: comfortable with epidural     ASSESSMENTS:     /81   Pulse 100   Temp (P) 98.1 °F (36.7 °C) (Oral)   Resp (P) 18   Breastfeeding Yes     Fetal Heart Rate Assessment   Method: Fetal HR Assessment Method: external   Beats/min: Fetal HR (beats/min): 125   Baseline: Fetal HR Baseline: normal range   Varibility: Fetal HR Variability: moderate (amplitude range 6 to 25 bpm)   Accels: Fetal HR Accelerations: greater than/equal to 15 bpm   Decels: Fetal HR Decelerations: absent   Tracing Category:       Uterine Assessment   Method: Method: external tocotransducer   Frequency (min): Contraction Frequency (Minutes): 2-3   Ctx Count in 10 min: Contractions in 10 Minutes: 2-3   Duration:     Intensity: Contraction Intensity: moderate by palpation   Intensity by IUPC:     Resting Tone: Uterine Resting Tone: soft by palpation   Resting Tone by IUPC:       Presentation: cephalic   Cervix: Exam by: Method: (P) sterile exam per RN   Dilation: Cervical Dilation (cm): (P) 5-6   Effacement: Cervical Effacement: (P) 90%   Station: Fetal Station: (P) -1            IUP at 39w3d   GBS +          PLAN:  -s/p epidural placement  -FHT Cat I   -SVE recheck 9/100/+1  -Proceed with IV PPP, maternal position changes  -Continue PCN for GBS ppx  -Will start pushing with complete cervical dilation       Lisa Gifford DO  12:37 EST  03/04/24

## 2024-03-04 NOTE — PROGRESS NOTES
03/04/24  06:05 EST  Antonia Jurado    SUBJECTIVE: comfortable     ASSESSMENTS:     /79   Pulse 73   Temp 98.6 °F (37 °C) (Oral)   Resp 16   Breastfeeding Yes     Fetal Heart Rate Assessment   Method: Fetal HR Assessment Method: external   Beats/min: Fetal HR (beats/min): 115   Baseline: Fetal HR Baseline: normal range   Varibility: Fetal HR Variability: moderate (amplitude range 6 to 25 bpm)   Accels: Fetal HR Accelerations: greater than/equal to 15 bpm, lasting at least 15 seconds   Decels: Fetal HR Decelerations: absent   Tracing Category:       Uterine Assessment   Method: Method: external tocotransducer   Frequency (min): Contraction Frequency (Minutes): 2-3   Ctx Count in 10 min: Contractions in 10 Minutes: 3   Duration:     Intensity: Contraction Intensity: moderate by palpation   Intensity by IUPC:     Resting Tone: Uterine Resting Tone: soft by palpation   Resting Tone by IUPC:       Presentation: cephalic   Cervix: Exam by: Method: sterile exam per RN   Dilation: Cervical Dilation (cm): 4   Effacement: Cervical Effacement: 80%   Station: Fetal Station: -1            IUP at 39w3d           PLAN:  -SVE 4/90/-1  -Dicussed AROM including R/B/I/A  -Patient agreeable  -AROM performed with amniohook without complications. Return of clear fluid.   -Maternal and fetal tolerance of procedure  -FHT Cat I   -Continue IV Pit PP    Lisa Gifford DO  06:05 EST  03/04/24

## 2024-03-04 NOTE — ANESTHESIA PROCEDURE NOTES
Labor Epidural      Patient reassessed immediately prior to procedure    Patient location during procedure: floor  Performed By  CRNA/FLAKO: Patricia Ponce CRNA  Preanesthetic Checklist  Completed: patient identified, IV checked, site marked, risks and benefits discussed, surgical consent, monitors and equipment checked, pre-op evaluation and timeout performed  Prep:  Pt Position:sitting  Sterile Tech:cap, gloves, mask and sterile barrier  Prep:DuraPrep  Monitoring:blood pressure monitoring  Epidural Block Procedure:  Approach:midline  Guidance:palpation technique  Needle Type:Tuohy  Needle Gauge:17 G  Loss of Resistance Medium: air  Loss of Resistance: 6cm  Cath Depth at skin:11 cm  Paresthesia: none  Aspiration:negative  Test Dose:negative  Number of Attempts: 1  Post Assessment:  Dressing:occlusive dressing applied and secured with tape  Pt Tolerance:patient tolerated the procedure well with no apparent complications  Complications:no

## 2024-03-04 NOTE — H&P
[facility]  Obstetric History and Physical    eIOL      Subjective     Patient is a 18 y.o. female  currently at 39w2d, who presents for induction of labor  for elective  with favorable cervix. On intake reports intermittent contractions, denies  leakage of fluid, denies  vaginal bleeding. reports fetal movement.    Her prenatal care is complicated by  GBS +.  Her previous obstetric/gynecological history is noted for is non-contributory.    The following portions of the patients history were reviewed and updated as appropriate: current medications, allergies, past medical history, past surgical history, past family history, past social history, and problem list .       Prenatal Information:  Prenatal Results       Initial Prenatal Labs       Test Value Reference Range Date Time    Hemoglobin  13.0 g/dL 12.0 - 15.9 23 1521    Hematocrit  38.6 % 34.0 - 46.6 23 1521    Platelets  335 10*3/mm3 140 - 450 23 1521    Rubella IgG  2.39 index Immune >0.99 23 1521    Hepatitis B SAg  Non-Reactive  Non-Reactive 23 1521    Hepatitis C Ab  Non-Reactive  Non-Reactive 23 1521    RPR        T. Pallidum Ab         ABO  O   24 1834    Rh  Positive   24 1834    Antibody Screen  Negative   23 1521    HIV  Non-Reactive  Non-Reactive 23 1521    Urine Culture  No growth   23 1521    Gonorrhea        Chlamydia        TSH        HgB A1c         Varicella IgG  188 index Immune >165 23 1521    HgB Electrophoresis         Cystic fibrosis                   Fetal testing        Test Value Reference Range Date Time    NIPT        MSAFP        AFP-4                  2nd and 3rd Trimester       Test Value Reference Range Date Time    Hemoglobin (repeated)  12.1 g/dL 12.0 - 15.9 24 1834       12.1 g/dL 12.0 - 15.9 23 1111    Hematocrit (repeated)  36.8 % 34.0 - 46.6 24 1834       35.9 % 34.0 - 46.6 23 1111    Platelets   373 10*3/mm3 140 - 450  24 1834       360 10*3/mm3 140 - 450 23 1111       335 10*3/mm3 140 - 450 23 1521    GCT  101 mg/dL 65 - 139 23 1111    Antibody Screen (repeated)  Negative   24 1834       Negative   23 1111    Third Trimester syphilis screen (repeated)         GTT Fasting        GTT 1 Hr        GTT 2 Hr        GTT 3 Hr        Group B Strep                  Other testing        Test Value Reference Range Date Time    Parvo IgG         CMV IgG                   Drug Screening       Test Value Reference Range Date Time    Amphetamine Screen  Negative  Negative 23 1521    Barbiturate Screen  Negative  Negative 23 1521    Benzodiazepine Screen  Negative  Negative 23 1521    Methadone Screen  Negative  Negative 23 1521    Phencyclidine Screen  Negative  Negative 23 1521    Opiates Screen  Negative  Negative 23 1521    THC Screen  Negative  Negative 23 1521    Cocaine Screen  Negative  Negative 23 1521    Propoxyphene Screen  Negative  Negative 23 1521    Buprenorphine Screen  Negative  Negative 23 1521    Methamphetamine Screen  Negative  Negative 23 1521    Oxycodone Screen  Negative  Negative 23 1521    Tricyclic Antidepressants Screen  Negative  Negative 23 1521              Legend    ^: Historical                               Past OB History:       OB History    Para Term  AB Living   3 0 0 0 1 0   SAB IAB Ectopic Molar Multiple Live Births   1 0 0 0 0 0      # Outcome Date GA Lbr Frank/2nd Weight Sex Type Anes PTL Lv   3 Current            2 SAB  4w0d          1                 Past Medical History: Past Medical History:   Diagnosis Date    Anxiety     Depression     Polycystic kidney disease       Past Surgical History History reviewed. No pertinent surgical history.   Family History: History reviewed. No pertinent family history.   Social History:  reports that she has never smoked. She has  never used smokeless tobacco.   reports that she does not currently use alcohol.   reports no history of drug use.        REVIEW OF SYSTEMS              Reports fetal movement is normal             Denies leakage of amniotic fluid.             Denies vaginal bleeding             She reports Some cramping             All other systems reviewed and are negative    Objective       Vital Signs Range for the last 24 hours  Temperature: Temp:  [98.4 °F (36.9 °C)-99.6 °F (37.6 °C)] 98.4 °F (36.9 °C)   Temp Source: Temp src: Oral   BP: BP: (132-142)/(72-85) 132/85   Pulse: Heart Rate:  [] 97   Respirations: Resp:  [16] 16   SPO2:     O2 Amount (l/min):     O2 Devices     Weight:             Cervix: Exam by: Method: sterile exam per RN   Dilation: Cervical Dilation (cm): 4   Effacement: Cervical Effacement: 80%   Station: Fetal Station: -1        Fetal Heart Rate Assessment   Method: Fetal HR Assessment Method: external   Beats/min: Fetal HR (beats/min): 130   Baseline: Fetal HR Baseline: normal range   Varibility: Fetal HR Variability: moderate (amplitude range 6 to 25 bpm)   Accels: Fetal HR Accelerations: greater than/equal to 15 bpm, lasting at least 15 seconds   Decels: Fetal HR Decelerations: absent   Tracing Category:       Uterine Assessment   Method: Method: external tocotransducer   Frequency (min): Contraction Frequency (Minutes): 1-2   Ctx Count in 10 min: Contractions in 10 Minutes: 5   Duration:     Intensity: Contraction Intensity: moderate by palpation   Intensity by IUPC:     Resting Tone: Uterine Resting Tone: soft by palpation   Resting Tone by IUPC:     [place] Units:         Constitutional:  Well developed, well nourished, no acute distress, well-groomed.   Respiratory:  Lungs are clear to auscultation bilaterally, normal breath sounds.   Cardiovascular:  Normal rate and rhythm, no murmurs.   Gastrointestinal:  Soft, gravid, nontender.  Uterus: Soft, nontender. Fundus appropriate for  dates.  Neurologic:  Alert & oriented x 3,  no focal deficits noted.   Psychiatric:  Speech and behavior appropriate.   Extremities: no cyanosis, clubbing or edema, no evidence of DVT.        Labs:  Lab Results   Component Value Date    WBC 14.00 (H) 03/03/2024    HGB 12.1 03/03/2024    HCT 36.8 03/03/2024    MCV 85.8 03/03/2024     03/03/2024    CREATININE 0.55 (L) 10/15/2022    AST 44 (H) 10/15/2022    ALT 60 (H) 10/15/2022     Results from last 7 days   Lab Units 03/03/24  1834   ABO TYPING  O   RH TYPING  Positive   ANTIBODY SCREEN  Negative           Assessment & Plan       Pregnancy with 39 completed weeks gestation        Assessment:   IUP at 39w2d weeks gestation with reactive fetal status.    2.   induction of labor  for elective  with favorable cervix  3.   Obstetrical history significant for is non-contributory.  4.   GBS status: Positive  5.   Rh: O+    Plan:  Oxytocin induction  Continuous FHT and TOCO monitoring.   Diet: NPO  Start IV PCN per protocol for GBS ppx  Desires epidural for anesthesia. Anesthesia consulted.   Plan of care has been reviewed with patient and questions answered.  Risks, benefits of treatment plan have been discussed.   Consent obtained to proceed with Kirk Gifford DO  3/3/2024  20:10 EST

## 2024-03-05 LAB
BASOPHILS # BLD AUTO: 0.04 10*3/MM3 (ref 0–0.2)
BASOPHILS NFR BLD AUTO: 0.3 % (ref 0–1.5)
DEPRECATED RDW RBC AUTO: 45.5 FL (ref 37–54)
EOSINOPHIL # BLD AUTO: 0.09 10*3/MM3 (ref 0–0.4)
EOSINOPHIL NFR BLD AUTO: 0.6 % (ref 0.3–6.2)
ERYTHROCYTE [DISTWIDTH] IN BLOOD BY AUTOMATED COUNT: 14.8 % (ref 12.3–15.4)
HCT VFR BLD AUTO: 31.7 % (ref 34–46.6)
HGB BLD-MCNC: 10.1 G/DL (ref 12–15.9)
IMM GRANULOCYTES # BLD AUTO: 0.21 10*3/MM3 (ref 0–0.05)
IMM GRANULOCYTES NFR BLD AUTO: 1.5 % (ref 0–0.5)
LYMPHOCYTES # BLD AUTO: 1.77 10*3/MM3 (ref 0.7–3.1)
LYMPHOCYTES NFR BLD AUTO: 12.4 % (ref 19.6–45.3)
MCH RBC QN AUTO: 27.6 PG (ref 26.6–33)
MCHC RBC AUTO-ENTMCNC: 31.9 G/DL (ref 31.5–35.7)
MCV RBC AUTO: 86.6 FL (ref 79–97)
MONOCYTES # BLD AUTO: 1.13 10*3/MM3 (ref 0.1–0.9)
MONOCYTES NFR BLD AUTO: 7.9 % (ref 5–12)
NEUTROPHILS NFR BLD AUTO: 11.04 10*3/MM3 (ref 1.7–7)
NEUTROPHILS NFR BLD AUTO: 77.3 % (ref 42.7–76)
NRBC BLD AUTO-RTO: 0 /100 WBC (ref 0–0.2)
PLATELET # BLD AUTO: 304 10*3/MM3 (ref 140–450)
PMV BLD AUTO: 9.3 FL (ref 6–12)
RBC # BLD AUTO: 3.66 10*6/MM3 (ref 3.77–5.28)
WBC NRBC COR # BLD AUTO: 14.28 10*3/MM3 (ref 3.4–10.8)

## 2024-03-05 PROCEDURE — 85025 COMPLETE CBC W/AUTO DIFF WBC: CPT | Performed by: STUDENT IN AN ORGANIZED HEALTH CARE EDUCATION/TRAINING PROGRAM

## 2024-03-05 RX ORDER — FERROUS SULFATE 325(65) MG
325 TABLET ORAL
Status: DISCONTINUED | OUTPATIENT
Start: 2024-03-05 | End: 2024-03-06 | Stop reason: HOSPADM

## 2024-03-05 RX ADMIN — FERROUS SULFATE TAB 325 MG (65 MG ELEMENTAL FE) 325 MG: 325 (65 FE) TAB at 08:45

## 2024-03-05 RX ADMIN — DOCUSATE SODIUM 100 MG: 100 CAPSULE, LIQUID FILLED ORAL at 08:37

## 2024-03-05 RX ADMIN — DOCUSATE SODIUM 100 MG: 100 CAPSULE, LIQUID FILLED ORAL at 19:55

## 2024-03-05 RX ADMIN — ACETAMINOPHEN 650 MG: 325 TABLET ORAL at 18:57

## 2024-03-05 RX ADMIN — PRENATAL VITAMINS-IRON FUMARATE 27 MG IRON-FOLIC ACID 0.8 MG TABLET 1 TABLET: at 08:37

## 2024-03-05 RX ADMIN — IBUPROFEN 600 MG: 600 TABLET, FILM COATED ORAL at 15:00

## 2024-03-05 RX ADMIN — ACETAMINOPHEN 650 MG: 325 TABLET ORAL at 08:44

## 2024-03-05 NOTE — ANESTHESIA POSTPROCEDURE EVALUATION
Patient: Antonia Jurado    Procedure Summary       Date: 03/04/24 Room / Location:     Anesthesia Start: 0814 Anesthesia Stop: 1541    Procedure: LABOR ANALGESIA Diagnosis:     Scheduled Providers:  Provider: Edwin Jones DO    Anesthesia Type: epidural ASA Status: 2            Anesthesia Type: epidural    Vitals  Vitals Value Taken Time   /58 03/05/24 0727   Temp 97.9 °F (36.6 °C) 03/05/24 0727   Pulse 89 03/05/24 0727   Resp 16 03/05/24 0727   SpO2             Post Anesthesia Care and Evaluation    Patient location during evaluation: bedside  Patient participation: complete - patient participated  Level of consciousness: awake and alert  Pain management: adequate    Airway patency: patent  Anesthetic complications: No anesthetic complications    Cardiovascular status: acceptable  Respiratory status: acceptable  Hydration status: acceptable  Post Neuraxial Block status: Motor and sensory function returned to baseline and No signs or symptoms of PDPH

## 2024-03-05 NOTE — LACTATION NOTE
"   24 1505   Maternal Information   Date of Referral 24   Person Making Referral patient   Maternal Reason for Referral breastfeeding currently   Infant Reason for Referral  infant   Maternal Infant Feeding   Maternal Emotional State receptive;difficulty focusing   Infant Positioning cross-cradle  (left)   Signs of Milk Transfer none noted  (latch not achieved)   Pain with Feeding no   Comfort Measures Before/During Feeding other (see comments)  (small nipple shield in use)   Latch Assistance full assistance needed;verbal guidance offered   Breast Pumping   Breast Pumping Interventions frequent pumping encouraged  (encouraged to pump any time baby gets supplement)     Follow up visit per MOB request. Infant has had lots of formula, MOB has been pumping some. MOB attempting to latch but she states she thinks baby isnt hungry. Baby showing cues, sucking on hands, LC offered to assist with latch. Small nipple shield used. Infant with short bursts of sucks, then holding shield in mouth. Reviewed importance of pumping any time baby gets supplement. Reviewed that he may want to be \"topped off\" with formula until mature milk transitions in due to getting used to larger volumes. Encouraged to keep offering breast and to call as needs arise.   "

## 2024-03-05 NOTE — CASE MANAGEMENT/SOCIAL WORK
Continued Stay Note  [facility]     Patient Name: Antonia Juraod  MRN: [id number]  Today's Date: 3/5/2024    Admit Date: 3/3/2024    Plan: MSW available   Discharge Plan       Row Name 03/05/24 0734       Plan    Plan MSW available    Plan Comments Received consult re: + UDS. Reviewed pt's records. She had + UDS on 1/6/2023 from [facility]. She reported at that time that she was seeing a psychiatrist and was frustrated with her mental health and finding the right medication. She ordered a synthetic Benzodiazepam online and took too many and had an overdose. She denied any SI at that time. She left [facility]. She had - UDS on 8/29/2023. She reports h/o depression and anxiety.    Final Discharge Disposition Code 01 - home or self-care                   Discharge Codes    No documentation.                       CHHAYA Browning

## 2024-03-05 NOTE — LACTATION NOTE
24 0054   Maternal Information   Date of Referral 24   Person Making Referral lactation consultant  (courtesy visit, newly postpartum)   Maternal Reason for Referral breastfeeding currently   Infant Reason for Referral  infant   Maternal Assessment   Breast Size Issue none   Breast Shape Bilateral:;round   Breast Density Bilateral:;soft   Areola Bilateral:;elastic   Nipples everted   Left Nipple Symptoms intact;nontender   Right Nipple Symptoms intact;nontender   Milk Expression/Equipment   Breast Pump Type double electric, personal  (Motif)   Breast Pump Flange Type hard   Breast Pumping   Breast Pumping Interventions early pumping promoted;frequent pumping encouraged  (pump q. 3 hours when supplementing)     Reviewed breastfeeding tips handout with Mom. She was pumping when I entered the room. She has a Motif breast pump. She reports baby nursed well at 1845 x 20 min. She is wanting to breast and bottle feed. Encouraged to pump x 15 min. Every time baby is supplemented for optimal milk supply. She verbalized understanding. She wanted baby to stay in the nursery for the night. Answered all questions at this time. Encouraged to call lactation with further questions/concerns.

## 2024-03-05 NOTE — PAYOR COMM NOTE
Antonia Jurado (18 y.o. Female)     Delivery information.    From:Amaris Hernandez LPN, Utilization Review  Phone #[phone]  Fax #[phone]        Date of Birth   2005    Social Security Number       Address   [address] [place] [address]    Home Phone   [phone]    MRN   [id number]       Oriental orthodox   None    Marital Status   Single                            Admission Date   3/3/24    Admission Type   Elective    Admitting Provider   Lisa Gifford DO    Attending Provider   Lisa Gifford DO    Department, Room/Bed   [facility]AB[facility] 4A, N406/1       Discharge Date       Discharge Disposition       Discharge Destination                                 Attending Provider: Lisa Gifford DO    Allergies: Morphine    Isolation: None   Infection: None   Code Status: CPR    Ht: --   Wt: --    Admission Cmt: None   Principal Problem: Pregnancy with 39 completed weeks gestation [Z3A.39]                   Active Insurance as of 3/3/2024       Primary Coverage       Payor Plan Insurance Group Employer/Plan Group    [facility] MEDICAID [facility] MEDICAID [id number]       Payor Plan Address Payor Plan Phone Number Payor Plan Fax Number Effective Dates    [address] [address] [phone]  4/1/2021 - None Entered    [facility] [id number]         Subscriber Name Subscriber Birth Date Member ID       ANTONIA JURADO 2005 [id number]                     Emergency Contacts        (Rel.) Home Phone Work Phone Mobile Phone    RAMSEY JURADO (Father) -- -- [phone]    ANA JURADO (Mother) -- -- [phone]              Insurance Information                  [employer] Phone: [phone]    Subscriber: Antonia Jurado Subscriber#: [id number]    Group#: [id number] Precert#: --             History & Physical        Lisa Gifford DO at 03/03/24 [address] [place] [address]  Obstetric History and  Physical    eIOL      Subjective    Patient is a 18 y.o. female  currently at 39w2d, who presents for induction of labor  for elective  with favorable cervix. On intake reports intermittent contractions, denies  leakage of fluid, denies  vaginal bleeding. reports fetal movement.    Her prenatal care is complicated by  GBS +.  Her previous obstetric/gynecological history is noted for is non-contributory.    The following portions of the patients history were reviewed and updated as appropriate: current medications, allergies, past medical history, past surgical history, past family history, past social history, and problem list .       Prenatal Information:  Prenatal Results       Initial Prenatal Labs       Test Value Reference Range Date Time    Hemoglobin  13.0 g/dL 12.0 - 15.9 23 1521    Hematocrit  38.6 % 34.0 - 46.6 23 1521    Platelets  335 10*3/mm3 140 - 450 23 1521    Rubella IgG  2.39 index Immune >0.99 23 1521    Hepatitis B SAg  Non-Reactive  Non-Reactive 23 1521    Hepatitis C Ab  Non-Reactive  Non-Reactive 23 1521    RPR        T. Pallidum Ab         ABO  O   24 1834    Rh  Positive   24 1834    Antibody Screen  Negative   23 1521    HIV  Non-Reactive  Non-Reactive 23 1521    Urine Culture  No growth   23 1521    Gonorrhea        Chlamydia        TSH        HgB A1c         Varicella IgG  188 index Immune >165 23 1521    HgB Electrophoresis         Cystic fibrosis                   Fetal testing        Test Value Reference Range Date Time    NIPT        MSAFP        AFP-4                  2nd and 3rd Trimester       Test Value Reference Range Date Time    Hemoglobin (repeated)  12.1 g/dL 12.0 - 15.9 24 1834       12.1 g/dL 12.0 - 15.9 23 1111    Hematocrit (repeated)  36.8 % 34.0 - 46.6 24 1834       35.9 % 34.0 - 46.6 23 1111    Platelets   373 10*3/mm3 140 - 450 24 1834       360 10*3/mm3 140 -  450 23 1111       335 10*3/mm3 140 - 450 23 1521    GCT  101 mg/dL 65 - 139 23 1111    Antibody Screen (repeated)  Negative   24 1834       Negative   23 1111    Third Trimester syphilis screen (repeated)         GTT Fasting        GTT 1 Hr        GTT 2 Hr        GTT 3 Hr        Group B Strep                  Other testing        Test Value Reference Range Date Time    Parvo IgG         CMV IgG                   Drug Screening       Test Value Reference Range Date Time    Amphetamine Screen  Negative  Negative 23 1521    Barbiturate Screen  Negative  Negative 23 1521    Benzodiazepine Screen  Negative  Negative 23 1521    Methadone Screen  Negative  Negative 23 1521    Phencyclidine Screen  Negative  Negative 23 1521    Opiates Screen  Negative  Negative 23 1521    THC Screen  Negative  Negative 23 1521    Cocaine Screen  Negative  Negative 23 1521    Propoxyphene Screen  Negative  Negative 23 1521    Buprenorphine Screen  Negative  Negative 23 1521    Methamphetamine Screen  Negative  Negative 23 1521    Oxycodone Screen  Negative  Negative 23 1521    Tricyclic Antidepressants Screen  Negative  Negative 23 1521              Legend    ^: Historical                               Past OB History:       OB History    Para Term  AB Living   3 0 0 0 1 0   SAB IAB Ectopic Molar Multiple Live Births   1 0 0 0 0 0      # Outcome Date GA Lbr Frank/2nd Weight Sex Type Anes PTL Lv   3 Current            2 2022 4w0d          1                 Past Medical History: Past Medical History:   Diagnosis Date    Anxiety     Depression     Polycystic kidney disease       Past Surgical History History reviewed. No pertinent surgical history.   Family History: History reviewed. No pertinent family history.   Social History:  reports that she has never smoked. She has never used smokeless tobacco.   reports  that she does not currently use alcohol.   reports no history of drug use.        REVIEW OF SYSTEMS              Reports fetal movement is normal             Denies leakage of amniotic fluid.             Denies vaginal bleeding             She reports Some cramping             All other systems reviewed and are negative    Objective      Vital Signs Range for the last 24 hours  Temperature: Temp:  [98.4 °F (36.9 °C)-99.6 °F (37.6 °C)] 98.4 °F (36.9 °C)   Temp Source: Temp src: Oral   BP: BP: (132-142)/(72-85) 132/85   Pulse: Heart Rate:  [] 97   Respirations: Resp:  [16] 16   SPO2:     O2 Amount (l/min):     O2 Devices     Weight:             Cervix: Exam by: Method: sterile exam per RN   Dilation: Cervical Dilation (cm): 4   Effacement: Cervical Effacement: 80%   Station: Fetal Station: -1        Fetal Heart Rate Assessment   Method: Fetal HR Assessment Method: external   Beats/min: Fetal HR (beats/min): 130   Baseline: Fetal HR Baseline: normal range   Varibility: Fetal HR Variability: moderate (amplitude range 6 to 25 bpm)   Accels: Fetal HR Accelerations: greater than/equal to 15 bpm, lasting at least 15 seconds   Decels: Fetal HR Decelerations: absent   Tracing Category:       Uterine Assessment   Method: Method: external tocotransducer   Frequency (min): Contraction Frequency (Minutes): 1-2   Ctx Count in 10 min: Contractions in 10 Minutes: 5   Duration:     Intensity: Contraction Intensity: moderate by palpation   Intensity by IUPC:     Resting Tone: Uterine Resting Tone: soft by palpation   Resting Tone by IUPC:     [place] Units:         Constitutional:  Well developed, well nourished, no acute distress, well-groomed.   Respiratory:  Lungs are clear to auscultation bilaterally, normal breath sounds.   Cardiovascular:  Normal rate and rhythm, no murmurs.   Gastrointestinal:  Soft, gravid, nontender.  Uterus: Soft, nontender. Fundus appropriate for dates.  Neurologic:  Alert & oriented x 3,  no focal  deficits noted.   Psychiatric:  Speech and behavior appropriate.   Extremities: no cyanosis, clubbing or edema, no evidence of DVT.        Labs:  Lab Results   Component Value Date    WBC 14.00 (H) 03/03/2024    HGB 12.1 03/03/2024    HCT 36.8 03/03/2024    MCV 85.8 03/03/2024     03/03/2024    CREATININE 0.55 (L) 10/15/2022    AST 44 (H) 10/15/2022    ALT 60 (H) 10/15/2022     Results from last 7 days   Lab Units 03/03/24  1834   ABO TYPING  O   RH TYPING  Positive   ANTIBODY SCREEN  Negative           Assessment & Plan      Pregnancy with 39 completed weeks gestation        Assessment:   IUP at 39w2d weeks gestation with reactive fetal status.    2.   induction of labor  for elective  with favorable cervix  3.   Obstetrical history significant for is non-contributory.  4.   GBS status: Positive  5.   Rh: O+    Plan:  Oxytocin induction  Continuous FHT and TOCO monitoring.   Diet: NPO  Start IV PCN per protocol for GBS ppx  Desires epidural for anesthesia. Anesthesia consulted.   Plan of care has been reviewed with patient and questions answered.  Risks, benefits of treatment plan have been discussed.   Consent obtained to proceed with Kirk Gifford DO  3/3/2024  20:10 EST    Electronically signed by Lisa Gifford DO at 03/03/24 2013       Facility-Administered Medications as of 3/5/2024   Medication Dose Route Frequency Provider Last Rate Last Admin    acetaminophen (TYLENOL) tablet 650 mg  650 mg Oral Q6H PRN Lisa Gifford DO        benzocaine (AMERICAINE) 20 % rectal ointment 1 Application  1 Application Rectal PRN Lisa Gifford DO        benzocaine-menthol (DERMOPLAST) 20-0.5 % topical spray   Topical PRN Lisa Gifford DO   Given at 03/04/24 2010    calcium carbonate (TUMS) chewable tablet 500 mg (200 mg elemental)  1 tablet Oral TID PRN Lisa Gifford DO        carboprost (HEMABATE) injection 250 mcg  250 mcg Intramuscular PRN Lisa Gifford DO         diphenhydrAMINE (BENADRYL) capsule 25 mg  25 mg Oral Nightly PRN Napa, Lisa C, DO        docusate sodium (COLACE) capsule 100 mg  100 mg Oral BID Balta, Lisa C, DO   100 mg at 24    fentaNYL citrate (PF) (SUBLIMAZE) injection 50 mcg  50 mcg Intravenous Q1H PRN Balta, Lisa C, DO        HYDROcodone-acetaminophen (NORCO) 5-325 MG per tablet 1 tablet  1 tablet Oral Q4H PRN Napa, Lisa C, DO        Or    HYDROcodone-acetaminophen (NORCO)  MG per tablet 1 tablet  1 tablet Oral Q4H PRN Napa, Lisa C, DO        Hydrocortisone (Perianal) (ANUSOL-HC) 2.5 % rectal cream 1 Application  1 Application Rectal PRN Balta, Lisa C, DO        ibuprofen (ADVIL,MOTRIN) tablet 600 mg  600 mg Oral Q6H PRN Napa, Lisa C, DO   600 mg at 24    [COMPLETED] lactated ringers bolus 1,000 mL  1,000 mL Intravenous PRN Patricia Ponce CRNA 4,000 mL/hr at 24 0750 1,000 mL at 24 0750    lactated ringers infusion  125 mL/hr Intravenous Continuous Renaldo Giffordiannon C,  mL/hr at 24 1206 125 mL/hr at 24 1206    lanolin topical 1 Application  1 Application Topical Q1H PRN Balta, Lisa C, DO        magnesium hydroxide (MILK OF MAGNESIA) 400 MG/5ML suspension 30 mL  30 mL Oral Daily PRN Napa, Lisa C, DO        Measles, Mumps & Rubella Vac (MMR) injection 0.5 mL  0.5 mL Subcutaneous During Hospitalization Renaldo Giffordiannon C, DO        methylergonovine (METHERGINE) injection 200 mcg  200 mcg Intramuscular Once PRN Balta, Lisa C, DO        miSOPROStol (CYTOTEC) tablet 800 mcg  800 mcg Oral PRN Balta, Lisa C, DO        ondansetron (ZOFRAN) injection 4 mg  4 mg Intravenous Q6H PRN Balta, Lisa C, DO        ondansetron ODT (ZOFRAN-ODT) disintegrating tablet 4 mg  4 mg Oral Q8H PRN Lisa Gifford, DO        [] oxytocin (PITOCIN) 30 units in 0.9% sodium chloride 500 mL (premix)  250 mL/hr Intravenous Continuous Lisa Gifford C,   mL/hr at 03/04/24 1618 250 mL/hr at 03/04/24 1618    oxytocin (PITOCIN) 30 units in 0.9% sodium chloride 500 mL (premix)  125 mL/hr Intravenous Continuous PRN Lisa Gifford DO        [COMPLETED] penicillin G potassium 5 Million Units in sodium chloride 0.9 % 100 mL MBP  5 Million Units Intravenous Once Lisa Gifford, DO   5 Million Units at 03/03/24 1830    prenatal vitamin tablet 1 tablet  1 tablet Oral Daily Renaldo Giffordiannon C, DO   1 tablet at 03/04/24 2010    promethazine (PHENERGAN) tablet 12.5 mg  12.5 mg Oral Q4H PRN Lisa Gifford C, DO        ropivacaine (NAROPIN) 0.2 % injection  15 mL/hr Epidural Continuous Patricia Ponce, CRNA 15 mL/hr at 03/04/24 0828 15 mL/hr at 03/04/24 0828    simethicone (MYLICON) chewable tablet 80 mg  80 mg Oral 4x Daily PRN Lisa Gifford C, DO        sodium chloride 0.9 % flush 1-10 mL  1-10 mL Intravenous PRN Lisa Gifford C, DO        witch hazel-glycerin (TUCKS) pad   Topical PRN Lisa Gifford C, DO   Given at 03/04/24 2010     Orders (last 48 hrs)        Start     Ordered    03/05/24 0800  Sitz Bath  3 Times Daily        Comments: PRN    03/04/24 1952 03/05/24 0600  CBC & Differential  Morning Draw        Comments: Postpartum Day 1      03/04/24 1952 03/05/24 0600  CBC Auto Differential  PROCEDURE ONCE        Comments: Postpartum Day 1      03/04/24 2202 03/04/24 2200  Incentive Spirometry  Every 4 Hours While Awake       03/04/24 1952 03/04/24 2100  docusate sodium (COLACE) capsule 100 mg  2 Times Daily         03/04/24 1952 03/04/24 2045  prenatal vitamin tablet 1 tablet  Daily         03/04/24 1952 03/04/24 1953  Transfer Patient  Once         03/04/24 1952 03/04/24 1953  Code Status and Medical Interventions:  Continuous         03/04/24 1952    03/04/24 1953  Vital Signs Per Hospital Policy  Per Hospital Policy         03/04/24 1952 03/04/24 1953  Notify Physician  Until Discontinued         03/04/24 1952 03/04/24  1953  Up Ad Angie  Until Discontinued         03/04/24 1952 03/04/24 1953  Ambulate Patient  Every Shift       03/04/24 1952 03/04/24 1953  Diet: Regular/House Diet; Texture: Regular Texture (IDDSI 7); Fluid Consistency: Thin (IDDSI 0)  Diet Effective Now         03/04/24 1952 03/04/24 1953  Advance Diet As Tolerated -Regular  Until Discontinued         03/04/24 1952 03/04/24 1953  Fundal and Lochia Check  Per Order Details        Comments: Every 30 minutes x2, then Every Shift    03/04/24 1952 03/04/24 1953  RN to Assess Rh Status & Place RhIG Evaluation Order if Indicated  Continuous         03/04/24 1952 03/04/24 1953  Bladder Assessment  Per Order Details        Comments: Postpartum 1) Upon Admission to Unit & Every 4 Hours PRN Until Voiding. 2) Out of Bed to Void in 8 Hours.    03/04/24 1952 03/04/24 1953  Straight Cath  Per Order Details        Comments: Postpartum: If Distended & Unable to Void, May Repeat Once.    03/04/24 1952 03/04/24 1953  Indwelling Urinary Catheter  Per Order Details        Comments: Postpartum : After Straight Cathed x2 or if Greater Than 1000mL Residual, Insert Indwelling Urinary Catheter Until Further MD Order.    03/04/24 1952 03/04/24 1953  Apply Ice to Perineum  Per Order Details        Comments: For 20 Minutes Every 2 Hours    03/04/24 1952 03/04/24 1953  Waffle Cushion  Per Order Details        Comments: For Perineal Discomfort    03/04/24 1952 03/04/24 1953  Donut Ring  Per Order Details        Comments: For Perineal Pain    03/04/24 1952 03/04/24 1953  Kpad  Per Order Details        Comments: For Pain    03/04/24 1952 03/04/24 1953  Breast pump to bed  Once         03/04/24 1952 03/04/24 1953  If indicated -- Please administer RH Immunoglobulin based on results of cord blood evaluation and fetal screen lab tests, pharmacy to dispense  Per Order Details        Comments: See Process Instructions For Reference Range Details.    03/04/24  "1952 03/04/24 1953  Place Sequential Compression Device  Once         03/04/24 1952 03/04/24 1953  Maintain Sequential Compression Device  Continuous         03/04/24 1952 03/04/24 1952  diphenhydrAMINE (BENADRYL) capsule 25 mg  Nightly PRN         03/04/24 1952 03/04/24 1952  magnesium hydroxide (MILK OF MAGNESIA) 400 MG/5ML suspension 30 mL  Daily PRN         03/04/24 1952 03/04/24 1952  lanolin topical 1 Application  Every 1 Hour PRN         03/04/24 1952 03/04/24 1952  benzocaine-menthol (DERMOPLAST) 20-0.5 % topical spray  As Needed         03/04/24 1952 03/04/24 1952  witch hazel-glycerin (TUCKS) pad  As Needed         03/04/24 1952 03/04/24 1952  Hydrocortisone (Perianal) (ANUSOL-HC) 2.5 % rectal cream 1 Application  As Needed         03/04/24 1952 03/04/24 1952  benzocaine (AMERICAINE) 20 % rectal ointment 1 Application  As Needed         03/04/24 1952 03/04/24 1952  ondansetron ODT (ZOFRAN-ODT) disintegrating tablet 4 mg  Every 8 Hours PRN         03/04/24 1952 03/04/24 1952  ondansetron (ZOFRAN) injection 4 mg  Every 6 Hours PRN         03/04/24 1952 03/04/24 1952  promethazine (PHENERGAN) tablet 12.5 mg  Every 4 Hours PRN         03/04/24 1952 03/04/24 1952  simethicone (MYLICON) chewable tablet 80 mg  4 Times Daily PRN         03/04/24 1952 03/04/24 1952  calcium carbonate (TUMS) chewable tablet 500 mg (200 mg elemental)  3 Times Daily PRN         03/04/24 1952 03/04/24 1952  Measles, Mumps & Rubella Vac (MMR) injection 0.5 mL  During Hospitalization         03/04/24 1952 03/04/24 1952  HYDROcodone-acetaminophen (NORCO) 5-325 MG per tablet 1 tablet  Every 4 Hours PRN        Placed in \"Or\" Linked Group    03/04/24 1952 03/04/24 1952  HYDROcodone-acetaminophen (NORCO)  MG per tablet 1 tablet  Every 4 Hours PRN        Placed in \"Or\" Linked Group    03/04/24 1952 03/04/24 1952  sodium chloride 0.9 % flush 1-10 mL  As Needed         03/04/24 1952    " "03/04/24 1952  oxytocin (PITOCIN) 30 units in 0.9% sodium chloride 500 mL (premix)  Continuous PRN         03/04/24 1952 03/04/24 1952  ibuprofen (ADVIL,MOTRIN) tablet 600 mg  Every 6 Hours PRN         03/04/24 1952 03/04/24 1952  acetaminophen (TYLENOL) tablet 650 mg  Every 6 Hours PRN         03/04/24 1952 03/04/24 1952  carboprost (HEMABATE) injection 250 mcg  As Needed         03/04/24 1952 03/04/24 1952  miSOPROStol (CYTOTEC) tablet 800 mcg  As Needed         03/04/24 1952 03/04/24 1952  methylergonovine (METHERGINE) injection 200 mcg  Once As Needed         03/04/24 1952 03/04/24 1850  Case Management  Consult  Once        Provider:  (Not yet assigned)    03/04/24 1849    03/04/24 1700  oxytocin (PITOCIN) 30 units in 0.9% sodium chloride 500 mL (premix)  Continuous        Placed in \"Followed by\" Linked Group    03/04/24 1549    03/04/24 1645  oxytocin (PITOCIN) 30 units in 0.9% sodium chloride 500 mL (premix)  Once,   Status:  Discontinued        Placed in \"Followed by\" Linked Group    03/04/24 1549    03/04/24 1620  DIET MESSAGE She would like chicken fingers , french fries, ketchup, honey mustard and a coke please  Once        Comments: She would like chicken fingers , french fries, ketchup, honey mustard and a coke please    03/04/24 1621    03/04/24 1550  Nurse may remove epidural catheter after delivery.  Until Discontinued,   Status:  Canceled         03/04/24 1549    03/04/24 1550  Transfer to postpartum when discharge criteria met.  Until Discontinued,   Status:  Canceled         03/04/24 1549    03/04/24 1550  Notify Physician (specified)  Until Discontinued,   Status:  Canceled         03/04/24 1549    03/04/24 1550  Vital Signs Per hospital policy  Per Hospital Policy,   Status:  Canceled         03/04/24 1549    03/04/24 1550  Fundal & Lochia Check  Per Order Details,   Status:  Canceled        Comments: Every 15 Minutes x4, Then Every 30 Minutes x2, Then Every " "Shift    03/04/24 1549    03/04/24 1550  Diet: Regular/House Diet; Texture: Regular Texture (IDDSI 7); Fluid Consistency: Thin (IDDSI 0)  Diet Effective Now,   Status:  Canceled         03/04/24 1549    03/04/24 1550  Blood Gas, Arterial, Cord  Once        Comments: If requested by provider at the time of delivery      03/04/24 1549    03/04/24 1549  Up with Assistance  As Needed,   Status:  Canceled       03/04/24 1549    03/04/24 1549  Fundal & Lochia Check  Every Shift,   Status:  Canceled       03/04/24 1549    03/04/24 1549  Apply Ice to Perineum  As Needed,   Status:  Canceled       03/04/24 1549 03/04/24 1549  Bladder Assessment  As Needed,   Status:  Canceled       03/04/24 1549    03/04/24 1549  methylergonovine (METHERGINE) injection 200 mcg  Once As Needed,   Status:  Discontinued         03/04/24 1549    03/04/24 1549  carboprost (HEMABATE) injection 250 mcg  As Needed,   Status:  Discontinued         03/04/24 1549    03/04/24 1549  miSOPROStol (CYTOTEC) tablet 800 mcg  As Needed,   Status:  Discontinued         03/04/24 1549    03/04/24 1549  ondansetron ODT (ZOFRAN-ODT) disintegrating tablet 4 mg  Every 6 Hours PRN,   Status:  Discontinued        Placed in \"Or\" Linked Group    03/04/24 1549    03/04/24 1549  ondansetron (ZOFRAN) injection 4 mg  Every 6 Hours PRN,   Status:  Discontinued        Placed in \"Or\" Linked Group    03/04/24 1549    03/04/24 0830  ropivacaine (NAROPIN) 0.2 % injection  Continuous         03/04/24 0732    03/04/24 0830  Sod Citrate-Citric Acid (BICITRA) oral solution 30 mL  Once,   Status:  Discontinued         03/04/24 0732    03/04/24 0732  Vital Signs Per Anesthesia Guidelines  Per Order Details,   Status:  Canceled        Comments: Every 3 Minutes x20 Minutes Following Epidural Dosing, Then Every 15 Minutes If Stable    03/04/24 0732    03/04/24 0732  Start IV with #16 or #18 gauge angiocath.  Once,   Status:  Canceled         03/04/24 0732    03/04/24 0732  Nurse or " "anesthesiologist to remain with patient for 15 minutes following dosing.  Until Discontinued,   Status:  Canceled         03/04/24 0732    03/04/24 0732  Facilitate maternal postion on side and maintain uterine displacement.  Until Discontinued,   Status:  Canceled         03/04/24 0732    03/04/24 0732  Consult anesthesia services prior to changing epidural infusion/rate.  Until Discontinued,   Status:  Canceled         03/04/24 0732    03/04/24 0731  lactated ringers bolus 1,000 mL  As Needed         03/04/24 0732    03/04/24 0731  ePHEDrine Sulfate (Pressors) 5 MG/ML injection 10 mg  Every 10 Minutes PRN,   Status:  Discontinued         03/04/24 0732    03/04/24 0731  metoclopramide (REGLAN) injection 10 mg  Once As Needed,   Status:  Discontinued         03/04/24 0732    03/04/24 0731  ondansetron (ZOFRAN) injection 4 mg  Once As Needed,   Status:  Discontinued         03/04/24 0732    03/04/24 0731  famotidine (PEPCID) injection 20 mg  Once As Needed,   Status:  Discontinued         03/04/24 0732    03/04/24 0731  diphenhydrAMINE (BENADRYL) injection 12.5 mg  Every 8 Hours PRN,   Status:  Discontinued         03/04/24 0732    03/04/24 0555  fentaNYL citrate (PF) (SUBLIMAZE) injection 50 mcg  Every 1 Hour PRN         03/04/24 0555    03/04/24 0000  Group B Streptococcus Culture - Swab, Vaginal/Rectum        Comments: This is an external result entered through the Results Console.      03/04/24 1202    03/03/24 2200  penicillin G in iso-osmotic dextrose IVPB 3 million units (premix)  Every 4 Hours,   Status:  Discontinued        Placed in \"Followed by\" Linked Group    03/03/24 1707    03/03/24 2100  sodium chloride 0.9 % flush 10 mL  Every 12 Hours Scheduled,   Status:  Discontinued         03/03/24 1707 03/03/24 2000  Vital Signs q 4 while awake  Every 4 Hours,   Status:  Canceled      Comments: While the patient is awake.    03/03/24 1707 03/03/24 1800  lactated ringers bolus 1,000 mL  Once,   Status:  " "Discontinued         03/03/24 1707 03/03/24 1800  lactated ringers infusion  Continuous         03/03/24 1707 03/03/24 1800  oxytocin (PITOCIN) 30 units in 0.9% sodium chloride 500 mL (premix)  Titrated,   Status:  Discontinued         03/03/24 1707 03/03/24 1800  mineral oil liquid 30 mL  Once,   Status:  Discontinued         03/03/24 1707    03/03/24 1800  penicillin G potassium 5 Million Units in sodium chloride 0.9 % 100 mL MBP  Once        Placed in \"Followed by\" Linked Group    03/03/24 1707 03/03/24 1708  Admit To Obstetrics Inpatient  Once         03/03/24 1707    03/03/24 1708  Code Status and Medical Interventions:  Continuous,   Status:  Canceled         03/03/24 1707 03/03/24 1708  Obtain informed consent  Once,   Status:  Canceled         03/03/24 1707    03/03/24 1708  Vital Signs Per hospital policy  Per Hospital Policy,   Status:  Canceled         03/03/24 1707    03/03/24 1708  Continuous Fetal Monitoring With NST on Admission and Prior to Initiation of Oxytocin.  Per Order Details,   Status:  Canceled        Comments: Continuous Fetal Monitoring With NST on Admission & Prior to Initiation of Oxytocin.    03/03/24 1707 03/03/24 1708  External Uterine Contraction Monitoring  Per Hospital Policy,   Status:  Canceled         03/03/24 1707    03/03/24 1708  Notify Physician (specified)  Until Discontinued,   Status:  Canceled         03/03/24 1707    03/03/24 1708  Notify physician for tachysystole (per hospital algorithm)  Until Discontinued,   Status:  Canceled         03/03/24 1707    03/03/24 1708  Notify physician if membranes ruptured, bleeding greater than 1 pad an hour, fetal heart tone abnormality, and severe pain  Until Discontinued,   Status:  Canceled         03/03/24 1707    03/03/24 1708  Up Ad Angei  Until Discontinued,   Status:  Canceled         03/03/24 1707    03/03/24 1708  Initiate Group Beta Strep (GBS) Prophylaxis Protocol, If Criteria Met  Continuous,   Status:  " "Canceled        Comments: NO TREATMENT RECOMMENDED IF: 1)  Maternal GBS status known negative 2)  Scheduled  birth with intact membranes, not in labor.  3 ) Maternal GBS unknown, no risk factors.   TREAT WITH ANTIBIOTICS IF:  1)  Maternal GBS status is known postive.  2)  Maternal GBS status unknown with these risk factors:  a)  Previous infant affected by GBS infection.  b)  GBS urinary tract infection (UTI) or bacteruria during pregnancy  c)  Unexplained maternal fever in labor (greater than or equal to 100.4F or 38.0C)  d)  Prolonged rupture of the membranes greater than or equal to 18 hours.  e)  Gestational age less than 37 weeks.    24 1707    24 170  Assess Need for Indwelling Urinary Catheter - Follow Removal Protocol  Continuous,   Status:  Canceled        Comments: Indwelling Urinary Catheter Removal Criteria  Discontinue Indwelling Urinary Catheter Unless One of the Following is Present:  Urinary Retention or Obstruction  Chronic Urinary Catheter Use  End of Life  Critical Illness with Strict I/O   Tract or Abdominal Surgery  Stage 3/4 Sacral / Perineal Wound  Required Activity Restriction: Trauma  Required Activity Restriction: Spine Surgery  If Patient is Being Followed by Urology Contact Them PRIOR to Removal  Do Not Remove Indwelling Urinary Catheter Order is Present with a CLINICAL REASON to Maintain the Catheter. Provider is Required to Include a Clinical Reason to Maintain a Urinary Catheter    Patient Admitted With Indwelling Urinary Catheter (Not Placed at [facility])  Assess for Continued Need & Document Medical Necessity  If Infection is Suspected, Contact the Provider       Placed in \"And\" Linked Group    24 1707    24 1708  Urinary Catheter Care  Every Shift,   Status:  Canceled      Placed in \"And\" Linked Group    24 1707    24 1708  Singh Bulb Cervical Ripening w/o infusion  Once,   Status:  Canceled        Comments: Have Laborist Place " "Cervical Singh Without Infusion.    Once FB is placed: Start Low Dose Pitocin Drip Overnight, Then Increase Per Protocol at 0500 Next Morning    03/03/24 1707    03/03/24 1708  NPO Diet NPO Type: Ice Chips  Diet Effective Now,   Status:  Canceled         03/03/24 1707    03/03/24 1708  CBC (No Diff)  Once         03/03/24 1707    03/03/24 1708  T Pallidum Antibody w/ reflex RPR (Syphilis)  Once         03/03/24 1707    03/03/24 1708  Type & Screen  Once         03/03/24 1707    03/03/24 1708  Insert Peripheral IV  Once,   Status:  Canceled         03/03/24 1707    03/03/24 1708  Saline Lock & Maintain IV Access  Continuous,   Status:  Canceled         03/03/24 1707    03/03/24 1708  Place Sequential Compression Device  Once,   Status:  Canceled         03/03/24 1707    03/03/24 1708  Maintain Sequential Compression Device  Continuous,   Status:  Canceled         03/03/24 1707    03/03/24 1707  Position change  As Needed,   Status:  Canceled      Comments: For intra-uterine resuscitation for hypertonus, hypertstimulation, or non-reassuring fetal status    03/03/24 1707    03/03/24 1707  Insert Indwelling Urinary Catheter  As Needed,   Status:  Canceled        Comments: EPIDURAL PLACEMENT   Placed in \"And\" Linked Group    03/03/24 1707    03/03/24 1707  sodium chloride 0.9 % flush 10 mL  As Needed,   Status:  Discontinued         03/03/24 1707    03/03/24 1707  sodium chloride 0.9 % infusion 40 mL  As Needed,   Status:  Discontinued         03/03/24 1707    03/03/24 1707  lidocaine PF 1% (XYLOCAINE) injection 0.5 mL  Once As Needed,   Status:  Discontinued         03/03/24 1707    03/03/24 1707  acetaminophen (TYLENOL) tablet 650 mg  Every 4 Hours PRN,   Status:  Discontinued         03/03/24 1707 03/03/24 1707  ondansetron ODT (ZOFRAN-ODT) disintegrating tablet 4 mg  Every 6 Hours PRN,   Status:  Discontinued        Placed in \"Or\" Linked Group    03/03/24 1707    03/03/24 1707  ondansetron (ZOFRAN) injection 4 mg  " "Every 6 Hours PRN,   Status:  Discontinued        Placed in \"Or\" Linked Group    24 1707    24 1707  promethazine (PHENERGAN) suppository 12.5 mg  Every 6 Hours PRN,   Status:  Discontinued        Placed in \"Or\" Linked Group    24 1707    24 1707  promethazine (PHENERGAN) tablet 12.5 mg  Every 6 Hours PRN,   Status:  Discontinued        Placed in \"Or\" Linked Group    24 1707    24 1707  terbutaline (BRETHINE) injection 0.25 mg  As Needed,   Status:  Discontinued         24 1707    24 1707  morphine injection 1 mg  Every 4 Hours PRN,   Status:  Discontinued        Placed in \"And\" Linked Group    24 1707    24 1707  naloxone (NARCAN) injection 0.4 mg  Every 5 Minutes PRN,   Status:  Discontinued        Placed in \"And\" Linked Group    24 1707    24 1707  morphine injection 2 mg  Every 4 Hours PRN,   Status:  Discontinued        Placed in \"And\" Linked Group    24 1707    24 1707  naloxone (NARCAN) injection 0.4 mg  Every 5 Minutes PRN,   Status:  Discontinued        Placed in \"And\" Linked Group    24 1707    Unscheduled  Warm compress  As Needed       24    Unscheduled  Apply ace wrap, tight bra, or binder  As Needed       24    Unscheduled  Apply ice packs  As Needed       24                     Operative/Procedure Notes (last 48 hours)        Lisa Gifford DO at 24 1600           [facility]   Vaginal Delivery Note    Patient Name: Antonia Jurado  : 2005  MRN: [id number]    Date of Delivery: 3/4/2024     Diagnosis     Pre & Post-Delivery:  Intrauterine pregnancy at 39w3d  Labor status: Induced Onset of Labor     Pregnancy with 39 completed weeks gestation             Problem List    Transfer to Postpartum     Review the Delivery Report for details.     Delivery     Delivery:      YOB: 2025    Time of Birth:  Gestational Age 3:37 PM   39w3d   "   Anesthesia: Epidural     Delivering clinician:     Forceps?   No   Vacuum? No    Shoulder dystocia present: No        Delivery narrative:  Patient admitted to L&D for eIOL. Connected to external FHT and TOCO on presentation to unit. SVE on intake confirmed that she was 4/80/-1. Patient was heather approximately every 5 minutes. FHT demonstrated initial Category I findings. IV Pitocin per protocol initiated. Patient received epidural and was comfortable with contractions. AROM with amnihook performed with return of clear fluid. Maternal and fetal tolerance of procedure.     Labor progressed steadily. Patient felt increasingly more painful with contractions. Patient progressed to complete and felt the urge to push. Patient was placed in dorsal lithotomy position with feet in stirrups bilaterally. Patient was instructed on pushing in concert with contractions. Patient delivered infant vertex in PABLO position. No nuchal cord was present. Anterior left shoulder delivered by gentle downward traction then followed by right posterior shoulder by gentle upward traction without complication. No shoulder dystocia. Body followed without difficulty. Male infant delivered pink, active, with good tone and was placed on maternal abdomen. Delayed cord clamping was performed for 1 minute while infant was stimulated and vigorous cry noted. Umbilical cord was doubly clamped and cut by friend of patient. Infant was attended to by labor and delivery and nursery nurse. Umbilical cord blood collected. Placenta delivered intact spontaneously without complication via gentle umbilical cord traction. IV Pitocin was started. Fundal massage was initiated and fundus was found to be firm. Blood and clots were cleared from the vaginal vault. The perineum, vaginal walls, cervix, and paraurethral area were inspected thoroughly. A second degree midline perineal laceration was noted. Laceration reapproximated with 3-0 Vicryl. Epidural was  "effective for patient comfort. Hemostasis noted. No episiotomy was performed. Placenta was observed following delivery. Cotyledons all intact and membranes complete. Thick 3 vessel cord confirmed. No complications. Mother and baby were stable  when leaving delivery room.     I was present for all portions of delivery as listed above.        Infant     Findings: male  infant     Infant observations: Weight: 3690 g (8 lb 2.2 oz)   Length: 20  in  Observations/Comments:        Apgars:   @ 1 minute /      @ 5 minutes   Infant Name: Jesus     Placenta & Cord         Placenta delivered  Spontaneous  at   3/4/2024  3:41 PM     Cord:   present.   Nuchal Cord?  no   Cord blood obtained:     Cord gases obtained:      Cord gas results: Venous:  No results found for: \"PHCVEN\", \"BECVEN\"    Arterial:  No results found for: \"PHCART\", \"BECART\"     Repair     Episiotomy: Not recorded     No    Lacerations: Yes  Laceration Information  Laceration Repaired?   Perineal:    yes   Periurethral:       Labial:       Sulcus:       Vaginal:       Cervical:         Suture used for repair: 3-0 Vicryl     Estimated Blood Loss:  250ml     Quantitative Blood Loss:          Complications     none    Disposition     Mother to Mother Baby/Postpartum  in stable condition currently.  Baby to NBN  in stable condition currently.    Lisa Gifford DO  03/04/24  16:05 EST          Electronically signed by Lisa Gifford DO at 03/04/24 1606          Physician Progress Notes (last 48 hours)        Lisa Gifford DO at 03/04/24 1237          03/04/24  12:37 EST  Antonia Jurado    SUBJECTIVE: comfortable with epidural     ASSESSMENTS:     /81   Pulse 100   Temp (P) 98.1 °F (36.7 °C) (Oral)   Resp (P) 18   Breastfeeding Yes     Fetal Heart Rate Assessment   Method: Fetal HR Assessment Method: external   Beats/min: Fetal HR (beats/min): 125   Baseline: Fetal HR Baseline: normal range   Varibility: Fetal HR Variability: moderate " (amplitude range 6 to 25 bpm)   Accels: Fetal HR Accelerations: greater than/equal to 15 bpm   Decels: Fetal HR Decelerations: absent   Tracing Category:       Uterine Assessment   Method: Method: external tocotransducer   Frequency (min): Contraction Frequency (Minutes): 2-3   Ctx Count in 10 min: Contractions in 10 Minutes: 2-3   Duration:     Intensity: Contraction Intensity: moderate by palpation   Intensity by IUPC:     Resting Tone: Uterine Resting Tone: soft by palpation   Resting Tone by IUPC:       Presentation: cephalic   Cervix: Exam by: Method: (P) sterile exam per RN   Dilation: Cervical Dilation (cm): (P) 5-6   Effacement: Cervical Effacement: (P) 90%   Station: Fetal Station: (P) -1            IUP at 39w3d   GBS +          PLAN:  -s/p epidural placement  -FHT Cat I   -SVE recheck 9/100/+1  -Proceed with IV PPP, maternal position changes  -Continue PCN for GBS ppx  -Will start pushing with complete cervical dilation       Lisa Gifford DO  12:37 EST  03/04/24        Electronically signed by Lisa Gifford DO at 03/04/24 1238       Lisa Gifford DO at 03/04/24 0615          03/04/24  11:11 EST  Antonia Jurado    SUBJECTIVE: comfortable     ASSESSMENTS:     /81   Pulse 100   Temp 98.3 °F (36.8 °C) (Oral)   Resp (P) 18   Breastfeeding Yes     Fetal Heart Rate Assessment   Method: Fetal HR Assessment Method: external   Beats/min: Fetal HR (beats/min): 120   Baseline: Fetal HR Baseline: normal range   Varibility: Fetal HR Variability: moderate (amplitude range 6 to 25 bpm)   Accels: Fetal HR Accelerations: greater than/equal to 15 bpm   Decels: Fetal HR Decelerations: absent   Tracing Category:       Uterine Assessment   Method: Method: external tocotransducer   Frequency (min): Contraction Frequency (Minutes): 2-3   Ctx Count in 10 min: Contractions in 10 Minutes: 2-3   Duration:     Intensity: Contraction Intensity: moderate by palpation   Intensity by IUPC:     Resting Tone:  Uterine Resting Tone: soft by palpation   Resting Tone by IUPC:       Presentation: cephalice   Cervix: Exam by: Method: (P) sterile exam per RN   Dilation: Cervical Dilation (cm): (P) 5-6   Effacement: Cervical Effacement: (P) 90%   Station: Fetal Station: (P) -1            IUP at 39w3d   GBS positive          PLAN:  -Comfortable  -SVE 4/90/-1  -PCN IV for GBS ppx  -AROM performed with return of clear fluid. Fetal and maternal tolerance of procedure.   -FHT Cat I  -Proceed with IV PPP    Lisa Gifford DO  11:11 EST  03/04/24        Electronically signed by Lisa Gifford DO at 03/04/24 1113       Lisa Gifford DO at 03/04/24 0605          03/04/24  06:05 EST  Antonia Jurado    SUBJECTIVE: comfortable     ASSESSMENTS:     /79   Pulse 73   Temp 98.6 °F (37 °C) (Oral)   Resp 16   Breastfeeding Yes     Fetal Heart Rate Assessment   Method: Fetal HR Assessment Method: external   Beats/min: Fetal HR (beats/min): 115   Baseline: Fetal HR Baseline: normal range   Varibility: Fetal HR Variability: moderate (amplitude range 6 to 25 bpm)   Accels: Fetal HR Accelerations: greater than/equal to 15 bpm, lasting at least 15 seconds   Decels: Fetal HR Decelerations: absent   Tracing Category:       Uterine Assessment   Method: Method: external tocotransducer   Frequency (min): Contraction Frequency (Minutes): 2-3   Ctx Count in 10 min: Contractions in 10 Minutes: 3   Duration:     Intensity: Contraction Intensity: moderate by palpation   Intensity by IUPC:     Resting Tone: Uterine Resting Tone: soft by palpation   Resting Tone by IUPC:       Presentation: cephalic   Cervix: Exam by: Method: sterile exam per RN   Dilation: Cervical Dilation (cm): 4   Effacement: Cervical Effacement: 80%   Station: Fetal Station: -1            IUP at 39w3d           PLAN:  -SVE 4/90/-1  -Dicussed AROM including R/B/I/A  -Patient agreeable  -AROM performed with amniohook without complications. Return of clear fluid.    -Maternal and fetal tolerance of procedure  -FHT Cat I   -Continue IV Pit PP    Lisa Gifford DO  06:05 EST  03/04/24        Electronically signed by Lisa Gifford DO at 03/04/24 0606

## 2024-03-05 NOTE — PAYOR COMM NOTE
Antonia Jurado (18 y.o. Female)        Problem List             Codes Noted - Resolved       Hospital    * (Principal) Pregnancy with 39 completed weeks gestation ICD-10-CM: Z3A.39  ICD-9-CM: V22.2 3/3/2024 - Present        History & Physical        Lisa Gifford DO SANDY at 24          [facility]  Obstetric History and Physical    eIOL      Subjective    Patient is a 18 y.o. female  currently at 39w2d, who presents for induction of labor  for elective  with favorable cervix. On intake reports intermittent contractions, denies  leakage of fluid, denies  vaginal bleeding. reports fetal movement.    Her prenatal care is complicated by  GBS +.  Her previous obstetric/gynecological history is noted for is non-contributory.    The following portions of the patients history were reviewed and updated as appropriate: current medications, allergies, past medical history, past surgical history, past family history, past social history, and problem list .       Prenatal Information:  Prenatal Results       Initial Prenatal Labs       Test Value Reference Range Date Time    Hemoglobin  13.0 g/dL 12.0 - 15.9 23 1521    Hematocrit  38.6 % 34.0 - 46.6 23 1521    Platelets  335 10*3/mm3 140 - 450 23 1521    Rubella IgG  2.39 index Immune >0.99 23 1521    Hepatitis B SAg  Non-Reactive  Non-Reactive 23 1521    Hepatitis C Ab  Non-Reactive  Non-Reactive 23 1521    RPR        T. Pallidum Ab         ABO  O   24 1834    Rh  Positive   24 1834    Antibody Screen  Negative   23 1521    HIV  Non-Reactive  Non-Reactive 23 1521    Urine Culture  No growth   23 1521    Gonorrhea        Chlamydia        TSH        HgB A1c         Varicella IgG  188 index Immune >165 23 1521    HgB Electrophoresis         Cystic fibrosis                   Fetal testing        Test Value Reference Range Date Time    NIPT        MSAFP        AFP-4                  2nd  and 3rd Trimester       Test Value Reference Range Date Time    Hemoglobin (repeated)  12.1 g/dL 12.0 - 15.9 24 1834       12.1 g/dL 12.0 - 15.9 23 1111    Hematocrit (repeated)  36.8 % 34.0 - 46.6 24 1834       35.9 % 34.0 - 46.6 23 1111    Platelets   373 10*3/mm3 140 - 450 24 1834       360 10*3/mm3 140 - 450 23 1111       335 10*3/mm3 140 - 450 23 1521    GCT  101 mg/dL 65 - 139 23 1111    Antibody Screen (repeated)  Negative   24 1834       Negative   23 1111    Third Trimester syphilis screen (repeated)         GTT Fasting        GTT 1 Hr        GTT 2 Hr        GTT 3 Hr        Group B Strep                  Other testing        Test Value Reference Range Date Time    [place] IgG         CMV IgG                   Drug Screening       Test Value Reference Range Date Time    Amphetamine Screen  Negative  Negative 23 1521    Barbiturate Screen  Negative  Negative 23 1521    Benzodiazepine Screen  Negative  Negative 23 1521    Methadone Screen  Negative  Negative 23 1521    Phencyclidine Screen  Negative  Negative 23 1521    Opiates Screen  Negative  Negative 23 1521    THC Screen  Negative  Negative 23 1521    Cocaine Screen  Negative  Negative 23 1521    Propoxyphene Screen  Negative  Negative 23 1521    Buprenorphine Screen  Negative  Negative 23 1521    Methamphetamine Screen  Negative  Negative 23 1521    Oxycodone Screen  Negative  Negative 23 1521    Tricyclic Antidepressants Screen  Negative  Negative 23 1521              Legend    ^: Historical                               Past OB History:       OB History    Para Term  AB Living   3 0 0 0 1 0   SAB IAB Ectopic Molar Multiple Live Births   1 0 0 0 0 0      # Outcome Date GA Lbr Frank/2nd Weight Sex Type Anes PTL Lv   3 Current            2 2022 4w0d          1                 Past Medical History:  Past Medical History:   Diagnosis Date    Anxiety     Depression     Polycystic kidney disease       Past Surgical History History reviewed. No pertinent surgical history.   Family History: History reviewed. No pertinent family history.   Social History:  reports that she has never smoked. She has never used smokeless tobacco.   reports that she does not currently use alcohol.   reports no history of drug use.        REVIEW OF SYSTEMS              Reports fetal movement is normal             Denies leakage of amniotic fluid.             Denies vaginal bleeding             She reports Some cramping             All other systems reviewed and are negative    Objective      Vital Signs Range for the last 24 hours  Temperature: Temp:  [98.4 °F (36.9 °C)-99.6 °F (37.6 °C)] 98.4 °F (36.9 °C)   Temp Source: Temp src: Oral   BP: BP: (132-142)/(72-85) 132/85   Pulse: Heart Rate:  [] 97   Respirations: Resp:  [16] 16   SPO2:     O2 Amount (l/min):     O2 Devices     Weight:             Cervix: Exam by: Method: sterile exam per RN   Dilation: Cervical Dilation (cm): 4   Effacement: Cervical Effacement: 80%   Station: Fetal Station: -1        Fetal Heart Rate Assessment   Method: Fetal HR Assessment Method: external   Beats/min: Fetal HR (beats/min): 130   Baseline: Fetal HR Baseline: normal range   Varibility: Fetal HR Variability: moderate (amplitude range 6 to 25 bpm)   Accels: Fetal HR Accelerations: greater than/equal to 15 bpm, lasting at least 15 seconds   Decels: Fetal HR Decelerations: absent   Tracing Category:       Uterine Assessment   Method: Method: external tocotransducer   Frequency (min): Contraction Frequency (Minutes): 1-2   Ctx Count in 10 min: Contractions in 10 Minutes: 5   Duration:     Intensity: Contraction Intensity: moderate by palpation   Intensity by IUPC:     Resting Tone: Uterine Resting Tone: soft by palpation   Resting Tone by IUPC:     [place] Units:         Constitutional:  Well  developed, well nourished, no acute distress, well-groomed.   Respiratory:  Lungs are clear to auscultation bilaterally, normal breath sounds.   Cardiovascular:  Normal rate and rhythm, no murmurs.   Gastrointestinal:  Soft, gravid, nontender.  Uterus: Soft, nontender. Fundus appropriate for dates.  Neurologic:  Alert & oriented x 3,  no focal deficits noted.   Psychiatric:  Speech and behavior appropriate.   Extremities: no cyanosis, clubbing or edema, no evidence of DVT.        Labs:  Lab Results   Component Value Date    WBC 14.00 (H) 03/03/2024    HGB 12.1 03/03/2024    HCT 36.8 03/03/2024    MCV 85.8 03/03/2024     03/03/2024    CREATININE 0.55 (L) 10/15/2022    AST 44 (H) 10/15/2022    ALT 60 (H) 10/15/2022     Results from last 7 days   Lab Units 03/03/24  1834   ABO TYPING  O   RH TYPING  Positive   ANTIBODY SCREEN  Negative           Assessment & Plan      Pregnancy with 39 completed weeks gestation        Assessment:   IUP at 39w2d weeks gestation with reactive fetal status.    2.   induction of labor  for elective  with favorable cervix  3.   Obstetrical history significant for is non-contributory.  4.   GBS status: Positive  5.   Rh: O+    Plan:  Oxytocin induction  Continuous FHT and TOCO monitoring.   Diet: NPO  Start IV PCN per protocol for GBS ppx  Desires epidural for anesthesia. Anesthesia consulted.   Plan of care has been reviewed with patient and questions answered.  Risks, benefits of treatment plan have been discussed.   Consent obtained to proceed with Kirk Gifford DO  3/3/2024  20:10 EST    Electronically signed by Lisa Gifford DO at 03/03/24 2013       Facility-Administered Medications as of 3/4/2024   Medication Dose Route Frequency Provider Last Rate Last Admin    acetaminophen (TYLENOL) tablet 650 mg  650 mg Oral Q6H PRN Lisa Gifford DO        benzocaine (AMERICAINE) 20 % rectal ointment 1 Application  1 Application Rectal PRN Lisa Gifford DO         benzocaine-menthol (DERMOPLAST) 20-0.5 % topical spray   Topical PRN [place], Lisa C, DO   Given at 03/04/24 2010    calcium carbonate (TUMS) chewable tablet 500 mg (200 mg elemental)  1 tablet Oral TID PRN [place], Lisa C, DO        carboprost (HEMABATE) injection 250 mcg  250 mcg Intramuscular PRN Balta, Lisa C, DO        diphenhydrAMINE (BENADRYL) capsule 25 mg  25 mg Oral Nightly PRN Balta, Lisa C, DO        docusate sodium (COLACE) capsule 100 mg  100 mg Oral BID Balta, Lisa C, DO   100 mg at 03/04/24 2010    fentaNYL citrate (PF) (SUBLIMAZE) injection 50 mcg  50 mcg Intravenous Q1H PRN Balta, Lisa C, DO        HYDROcodone-acetaminophen (NORCO) 5-325 MG per tablet 1 tablet  1 tablet Oral Q4H PRN [place], Lisa C, DO        Or    HYDROcodone-acetaminophen (NORCO)  MG per tablet 1 tablet  1 tablet Oral Q4H PRN [place], Lisa C, DO        Hydrocortisone (Perianal) (ANUSOL-HC) 2.5 % rectal cream 1 Application  1 Application Rectal PRN [place], Lisa C, DO        ibuprofen (ADVIL,MOTRIN) tablet 600 mg  600 mg Oral Q6H PRN Balta, Lisa C, DO   600 mg at 03/04/24 2010    [COMPLETED] lactated ringers bolus 1,000 mL  1,000 mL Intravenous PRN Patricia Ponce CRNA 4,000 mL/hr at 03/04/24 0750 1,000 mL at 03/04/24 0750    lactated ringers infusion  125 mL/hr Intravenous Continuous Renaldo Giffordiannon C,  mL/hr at 03/04/24 1206 125 mL/hr at 03/04/24 1206    lanolin topical 1 Application  1 Application Topical Q1H PRN Balta, Lisa C, DO        magnesium hydroxide (MILK OF MAGNESIA) 400 MG/5ML suspension 30 mL  30 mL Oral Daily PRN [place], Lisa C, DO        Measles, Mumps & Rubella Vac (MMR) injection 0.5 mL  0.5 mL Subcutaneous During Hospitalization [place], Lisa C, DO        methylergonovine (METHERGINE) injection 200 mcg  200 mcg Intramuscular Once PRN Lisa Gifford,         miSOPROStol (CYTOTEC) tablet 800 mcg  800 mcg Oral PRN Lisa Gifford, DO         ondansetron (ZOFRAN) injection 4 mg  4 mg Intravenous Q6H PRN Lisa Gifford C, DO        ondansetron ODT (ZOFRAN-ODT) disintegrating tablet 4 mg  4 mg Oral Q8H PRN Balta, Lisa C, DO        [] oxytocin (PITOCIN) 30 units in 0.9% sodium chloride 500 mL (premix)  250 mL/hr Intravenous Continuous Lisa Gifford,  mL/hr at 24 1618 250 mL/hr at 24 1618    oxytocin (PITOCIN) 30 units in 0.9% sodium chloride 500 mL (premix)  125 mL/hr Intravenous Continuous PRN Lisa Gifford DO        [COMPLETED] penicillin G potassium 5 Million Units in sodium chloride 0.9 % 100 mL MBP  5 Million Units Intravenous Once Lisa Gifford C, DO   5 Million Units at 24 1830    prenatal vitamin tablet 1 tablet  1 tablet Oral Daily Lisa Gifford, DO   1 tablet at 24    promethazine (PHENERGAN) tablet 12.5 mg  12.5 mg Oral Q4H PRN Lisa Gifford C, DO        ropivacaine (NAROPIN) 0.2 % injection  15 mL/hr Epidural Continuous Patricia Ponce CRNA 15 mL/hr at 24 0828 15 mL/hr at 24 0828    simethicone (MYLICON) chewable tablet 80 mg  80 mg Oral 4x Daily PRN Lisa Gifford C, DO        sodium chloride 0.9 % flush 1-10 mL  1-10 mL Intravenous PRN Papito Giffordnon C, DO        witch hazel-glycerin (TUCKS) pad   Topical PRN Balta, Lisa C, DO   Given at 24     Orders (last 48 hrs)        Start     Ordered    24 0800  Sitz Bath  3 Times Daily        Comments: PRN    24 0600  CBC & Differential  Morning Draw        Comments: Postpartum Day 1      24 2200  Incentive Spirometry  Every 4 Hours While Awake       24 2100  docusate sodium (COLACE) capsule 100 mg  2 Times Daily         24  prenatal vitamin tablet 1 tablet  Daily         24  Transfer Patient  Once         24  Code Status and Medical  Interventions:  Continuous         03/04/24 1952 03/04/24 1953  Vital Signs Per Hospital Policy  Per Hospital Policy         03/04/24 1952 03/04/24 1953  Notify Physician  Until Discontinued         03/04/24 1952 03/04/24 1953  Up Ad Angie  Until Discontinued         03/04/24 1952 03/04/24 1953  Ambulate Patient  Every Shift       03/04/24 1952 03/04/24 1953  Diet: Regular/House Diet; Texture: Regular Texture (IDDSI 7); Fluid Consistency: Thin (IDDSI 0)  Diet Effective Now         03/04/24 1952 03/04/24 1953  Advance Diet As Tolerated -Regular  Until Discontinued         03/04/24 1952 03/04/24 1953  Fundal and Lochia Check  Per Order Details        Comments: Every 30 minutes x2, then Every Shift    03/04/24 1952 03/04/24 1953  RN to Assess Rh Status & Place RhIG Evaluation Order if Indicated  Continuous         03/04/24 1952 03/04/24 1953  Bladder Assessment  Per Order Details        Comments: Postpartum 1) Upon Admission to Unit & Every 4 Hours PRN Until Voiding. 2) Out of Bed to Void in 8 Hours.    03/04/24 1952 03/04/24 1953  Straight Cath  Per Order Details        Comments: Postpartum: If Distended & Unable to Void, May Repeat Once.    03/04/24 1952 03/04/24 1953  Indwelling Urinary Catheter  Per Order Details        Comments: Postpartum : After Straight Cathed x2 or if Greater Than 1000mL Residual, Insert Indwelling Urinary Catheter Until Further MD Order.    03/04/24 1952 03/04/24 1953  Apply Ice to Perineum  Per Order Details        Comments: For 20 Minutes Every 2 Hours    03/04/24 1952 03/04/24 1953  Waffle Cushion  Per Order Details        Comments: For Perineal Discomfort    03/04/24 1952 03/04/24 1953  Donut Ring  Per Order Details        Comments: For Perineal Pain    03/04/24 1952 03/04/24 1953  Kpad  Per Order Details        Comments: For Pain    03/04/24 1952 03/04/24 1953  Breast pump to bed  Once         03/04/24 1952 03/04/24 1953  If indicated --  "Please administer RH Immunoglobulin based on results of cord blood evaluation and fetal screen lab tests, pharmacy to dispense  Per Order Details        Comments: See Process Instructions For Reference Range Details.    03/04/24 1952 03/04/24 1953  Place Sequential Compression Device  Once         03/04/24 1952 03/04/24 1953  Maintain Sequential Compression Device  Continuous         03/04/24 1952 03/04/24 1952  diphenhydrAMINE (BENADRYL) capsule 25 mg  Nightly PRN         03/04/24 1952 03/04/24 1952  magnesium hydroxide (MILK OF MAGNESIA) 400 MG/5ML suspension 30 mL  Daily PRN         03/04/24 1952 03/04/24 1952  lanolin topical 1 Application  Every 1 Hour PRN         03/04/24 1952 03/04/24 1952  benzocaine-menthol (DERMOPLAST) 20-0.5 % topical spray  As Needed         03/04/24 1952 03/04/24 1952  witch hazel-glycerin (TUCKS) pad  As Needed         03/04/24 1952 03/04/24 1952  Hydrocortisone (Perianal) (ANUSOL-HC) 2.5 % rectal cream 1 Application  As Needed         03/04/24 1952 03/04/24 1952  benzocaine (AMERICAINE) 20 % rectal ointment 1 Application  As Needed         03/04/24 1952 03/04/24 1952  ondansetron ODT (ZOFRAN-ODT) disintegrating tablet 4 mg  Every 8 Hours PRN         03/04/24 1952 03/04/24 1952  ondansetron (ZOFRAN) injection 4 mg  Every 6 Hours PRN         03/04/24 1952 03/04/24 1952  promethazine (PHENERGAN) tablet 12.5 mg  Every 4 Hours PRN         03/04/24 1952 03/04/24 1952  simethicone (MYLICON) chewable tablet 80 mg  4 Times Daily PRN         03/04/24 1952 03/04/24 1952  calcium carbonate (TUMS) chewable tablet 500 mg (200 mg elemental)  3 Times Daily PRN         03/04/24 1952 03/04/24 1952  Measles, Mumps & Rubella Vac (MMR) injection 0.5 mL  During Hospitalization         03/04/24 1952 03/04/24 1952  HYDROcodone-acetaminophen (NORCO) 5-325 MG per tablet 1 tablet  Every 4 Hours PRN        Placed in \"Or\" Linked Group    03/04/24 1952 03/04/24 " "1952  HYDROcodone-acetaminophen (NORCO)  MG per tablet 1 tablet  Every 4 Hours PRN        Placed in \"Or\" Linked Group    03/04/24 1952 03/04/24 1952  sodium chloride 0.9 % flush 1-10 mL  As Needed         03/04/24 1952 03/04/24 1952  oxytocin (PITOCIN) 30 units in 0.9% sodium chloride 500 mL (premix)  Continuous PRN         03/04/24 1952 03/04/24 1952  ibuprofen (ADVIL,MOTRIN) tablet 600 mg  Every 6 Hours PRN         03/04/24 1952 03/04/24 1952  acetaminophen (TYLENOL) tablet 650 mg  Every 6 Hours PRN         03/04/24 1952 03/04/24 1952  carboprost (HEMABATE) injection 250 mcg  As Needed         03/04/24 1952 03/04/24 1952  miSOPROStol (CYTOTEC) tablet 800 mcg  As Needed         03/04/24 1952 03/04/24 1952  methylergonovine (METHERGINE) injection 200 mcg  Once As Needed         03/04/24 1952 03/04/24 1850  Case Management  Consult  Once        Provider:  (Not yet assigned)    03/04/24 1849    03/04/24 1700  oxytocin (PITOCIN) 30 units in 0.9% sodium chloride 500 mL (premix)  Continuous        Placed in \"Followed by\" Linked Group    03/04/24 1549    03/04/24 1645  oxytocin (PITOCIN) 30 units in 0.9% sodium chloride 500 mL (premix)  Once,   Status:  Discontinued        Placed in \"Followed by\" Linked Group    03/04/24 1549    03/04/24 1620  DIET MESSAGE She would like chicken fingers , french fries, ketchup, honey mustard and a coke please  Once        Comments: She would like chicken fingers , french fries, ketchup, honey mustard and a coke please    03/04/24 1621    03/04/24 1550  Nurse may remove epidural catheter after delivery.  Until Discontinued,   Status:  Canceled         03/04/24 1549    03/04/24 1550  Transfer to postpartum when discharge criteria met.  Until Discontinued,   Status:  Canceled         03/04/24 1549    03/04/24 1550  Notify Physician (specified)  Until Discontinued,   Status:  Canceled         03/04/24 1549    03/04/24 1550  Vital Signs Per hospital " "policy  Per Hospital Policy,   Status:  Canceled         03/04/24 1549    03/04/24 1550  Fundal & Lochia Check  Per Order Details,   Status:  Canceled        Comments: Every 15 Minutes x4, Then Every 30 Minutes x2, Then Every Shift    03/04/24 1549    03/04/24 1550  Diet: Regular/House Diet; Texture: Regular Texture (IDDSI 7); Fluid Consistency: Thin (IDDSI 0)  Diet Effective Now,   Status:  Canceled         03/04/24 1549    03/04/24 1550  Blood Gas, Arterial, Cord  Once        Comments: If requested by provider at the time of delivery      03/04/24 1549    03/04/24 1549  Up with Assistance  As Needed,   Status:  Canceled       03/04/24 1549    03/04/24 1549  Fundal & Lochia Check  Every Shift,   Status:  Canceled       03/04/24 1549    03/04/24 1549  Apply Ice to Perineum  As Needed,   Status:  Canceled       03/04/24 1549    03/04/24 1549  Bladder Assessment  As Needed,   Status:  Canceled       03/04/24 1549    03/04/24 1549  methylergonovine (METHERGINE) injection 200 mcg  Once As Needed,   Status:  Discontinued         03/04/24 1549    03/04/24 1549  carboprost (HEMABATE) injection 250 mcg  As Needed,   Status:  Discontinued         03/04/24 1549    03/04/24 1549  miSOPROStol (CYTOTEC) tablet 800 mcg  As Needed,   Status:  Discontinued         03/04/24 1549    03/04/24 1549  ondansetron ODT (ZOFRAN-ODT) disintegrating tablet 4 mg  Every 6 Hours PRN,   Status:  Discontinued        Placed in \"Or\" Linked Group    03/04/24 1549    03/04/24 1549  ondansetron (ZOFRAN) injection 4 mg  Every 6 Hours PRN,   Status:  Discontinued        Placed in \"Or\" Linked Group    03/04/24 1549    03/04/24 0830  ropivacaine (NAROPIN) 0.2 % injection  Continuous         03/04/24 0732    03/04/24 0830  Sod Citrate-Citric Acid (BICITRA) oral solution 30 mL  Once,   Status:  Discontinued         03/04/24 0732    03/04/24 0732  Vital Signs Per Anesthesia Guidelines  Per Order Details,   Status:  Canceled        Comments: Every 3 Minutes " "x20 Minutes Following Epidural Dosing, Then Every 15 Minutes If Stable    03/04/24 0732    03/04/24 0732  Start IV with #16 or #18 gauge angiocath.  Once,   Status:  Canceled         03/04/24 0732    03/04/24 0732  Nurse or anesthesiologist to remain with patient for 15 minutes following dosing.  Until Discontinued,   Status:  Canceled         03/04/24 0732    03/04/24 0732  Facilitate maternal postion on side and maintain uterine displacement.  Until Discontinued,   Status:  Canceled         03/04/24 0732    03/04/24 0732  Consult anesthesia services prior to changing epidural infusion/rate.  Until Discontinued,   Status:  Canceled         03/04/24 0732    03/04/24 0731  lactated ringers bolus 1,000 mL  As Needed         03/04/24 0732 03/04/24 0731  ePHEDrine Sulfate (Pressors) 5 MG/ML injection 10 mg  Every 10 Minutes PRN,   Status:  Discontinued         03/04/24 0732    03/04/24 0731  metoclopramide (REGLAN) injection 10 mg  Once As Needed,   Status:  Discontinued         03/04/24 0732    03/04/24 0731  ondansetron (ZOFRAN) injection 4 mg  Once As Needed,   Status:  Discontinued         03/04/24 0732    03/04/24 0731  famotidine (PEPCID) injection 20 mg  Once As Needed,   Status:  Discontinued         03/04/24 0732    03/04/24 0731  diphenhydrAMINE (BENADRYL) injection 12.5 mg  Every 8 Hours PRN,   Status:  Discontinued         03/04/24 0732    03/04/24 0555  fentaNYL citrate (PF) (SUBLIMAZE) injection 50 mcg  Every 1 Hour PRN         03/04/24 0555    03/04/24 0000  Group B Streptococcus Culture - Swab, Vaginal/Rectum        Comments: This is an external result entered through the Results Console.      03/04/24 1202    03/03/24 2200  penicillin G in iso-osmotic dextrose IVPB 3 million units (premix)  Every 4 Hours,   Status:  Discontinued        Placed in \"Followed by\" Linked Group    03/03/24 1707    03/03/24 2100  sodium chloride 0.9 % flush 10 mL  Every 12 Hours Scheduled,   Status:  Discontinued         " "03/03/24 1707 03/03/24 2000  Vital Signs q 4 while awake  Every 4 Hours,   Status:  Canceled      Comments: While the patient is awake.    03/03/24 1707 03/03/24 1800  lactated ringers bolus 1,000 mL  Once,   Status:  Discontinued         03/03/24 1707 03/03/24 1800  lactated ringers infusion  Continuous         03/03/24 1707 03/03/24 1800  oxytocin (PITOCIN) 30 units in 0.9% sodium chloride 500 mL (premix)  Titrated,   Status:  Discontinued         03/03/24 1707 03/03/24 1800  mineral oil liquid 30 mL  Once,   Status:  Discontinued         03/03/24 1707 03/03/24 1800  penicillin G potassium 5 Million Units in sodium chloride 0.9 % 100 mL MBP  Once        Placed in \"Followed by\" Linked Group    03/03/24 1707 03/03/24 1708  Admit To Obstetrics Inpatient  Once         03/03/24 1707 03/03/24 1708  Code Status and Medical Interventions:  Continuous,   Status:  Canceled         03/03/24 1707    03/03/24 1708  Obtain informed consent  Once,   Status:  Canceled         03/03/24 1707    03/03/24 1708  Vital Signs Per hospital policy  Per Hospital Policy,   Status:  Canceled         03/03/24 1707    03/03/24 1708  Continuous Fetal Monitoring With NST on Admission and Prior to Initiation of Oxytocin.  Per Order Details,   Status:  Canceled        Comments: Continuous Fetal Monitoring With NST on Admission & Prior to Initiation of Oxytocin.    03/03/24 1707 03/03/24 1708  External Uterine Contraction Monitoring  Per Hospital Policy,   Status:  Canceled         03/03/24 1707    03/03/24 1708  Notify Physician (specified)  Until Discontinued,   Status:  Canceled         03/03/24 1707    03/03/24 1708  Notify physician for tachysystole (per hospital algorithm)  Until Discontinued,   Status:  Canceled         03/03/24 1707    03/03/24 1708  Notify physician if membranes ruptured, bleeding greater than 1 pad an hour, fetal heart tone abnormality, and severe pain  Until Discontinued,   Status:  Canceled    " "     24  Up Ad Angie  Until Discontinued,   Status:  Canceled         24  Initiate Group Beta Strep (GBS) Prophylaxis Protocol, If Criteria Met  Continuous,   Status:  Canceled        Comments: NO TREATMENT RECOMMENDED IF: 1)  Maternal GBS status known negative 2)  Scheduled  birth with intact membranes, not in labor.  3 ) Maternal GBS unknown, no risk factors.   TREAT WITH ANTIBIOTICS IF:  1)  Maternal GBS status is known postive.  2)  Maternal GBS status unknown with these risk factors:  a)  Previous infant affected by GBS infection.  b)  GBS urinary tract infection (UTI) or bacteruria during pregnancy  c)  Unexplained maternal fever in labor (greater than or equal to 100.4F or 38.0C)  d)  Prolonged rupture of the membranes greater than or equal to 18 hours.  e)  Gestational age less than 37 weeks.    24  Assess Need for Indwelling Urinary Catheter - Follow Removal Protocol  Continuous,   Status:  Canceled        Comments: Indwelling Urinary Catheter Removal Criteria  Discontinue Indwelling Urinary Catheter Unless One of the Following is Present:  Urinary Retention or Obstruction  Chronic Urinary Catheter Use  End of Life  Critical Illness with Strict I/O   Tract or Abdominal Surgery  Stage 3/4 Sacral / Perineal Wound  Required Activity Restriction: Trauma  Required Activity Restriction: Spine Surgery  If Patient is Being Followed by Urology Contact Them PRIOR to Removal  Do Not Remove Indwelling Urinary Catheter Order is Present with a CLINICAL REASON to Maintain the Catheter. Provider is Required to Include a Clinical Reason to Maintain a Urinary Catheter    Patient Admitted With Indwelling Urinary Catheter (Not Placed at [facility])  Assess for Continued Need & Document Medical Necessity  If Infection is Suspected, Contact the Provider       Placed in \"And\" Linked Group    24  Urinary " "Catheter Care  Every Shift,   Status:  Canceled      Placed in \"And\" Linked Group    03/03/24 1707    03/03/24 1708  Singh Bulb Cervical Ripening w/o infusion  Once,   Status:  Canceled        Comments: Have Laborist Place Cervical Singh Without Infusion.    Once FB is placed: Start Low Dose Pitocin Drip Overnight, Then Increase Per Protocol at 0500 Next Morning    03/03/24 1707    03/03/24 1708  NPO Diet NPO Type: Ice Chips  Diet Effective Now,   Status:  Canceled         03/03/24 1707    03/03/24 1708  CBC (No Diff)  Once         03/03/24 1707    03/03/24 1708  T Pallidum Antibody w/ reflex RPR (Syphilis)  Once         03/03/24 1707    03/03/24 1708  Type & Screen  Once         03/03/24 1707    03/03/24 1708  Insert Peripheral IV  Once,   Status:  Canceled         03/03/24 1707    03/03/24 1708  Saline Lock & Maintain IV Access  Continuous,   Status:  Canceled         03/03/24 1707    03/03/24 1708  Place Sequential Compression Device  Once,   Status:  Canceled         03/03/24 1707    03/03/24 1708  Maintain Sequential Compression Device  Continuous,   Status:  Canceled         03/03/24 1707    03/03/24 1707  Position change  As Needed,   Status:  Canceled      Comments: For intra-uterine resuscitation for hypertonus, hypertstimulation, or non-reassuring fetal status    03/03/24 1707    03/03/24 1707  Insert Indwelling Urinary Catheter  As Needed,   Status:  Canceled        Comments: EPIDURAL PLACEMENT   Placed in \"And\" Linked Group    03/03/24 1707    03/03/24 1707  sodium chloride 0.9 % flush 10 mL  As Needed,   Status:  Discontinued         03/03/24 1707    03/03/24 1707  sodium chloride 0.9 % infusion 40 mL  As Needed,   Status:  Discontinued         03/03/24 1707    03/03/24 1707  lidocaine PF 1% (XYLOCAINE) injection 0.5 mL  Once As Needed,   Status:  Discontinued         03/03/24 1707 03/03/24 1707  acetaminophen (TYLENOL) tablet 650 mg  Every 4 Hours PRN,   Status:  Discontinued         03/03/24 1707 " "   24 1707  ondansetron ODT (ZOFRAN-ODT) disintegrating tablet 4 mg  Every 6 Hours PRN,   Status:  Discontinued        Placed in \"Or\" Linked Group    24 1707    24 1707  ondansetron (ZOFRAN) injection 4 mg  Every 6 Hours PRN,   Status:  Discontinued        Placed in \"Or\" Linked Group    24 1707    24 1707  promethazine (PHENERGAN) suppository 12.5 mg  Every 6 Hours PRN,   Status:  Discontinued        Placed in \"Or\" Linked Group    24 1707    24 1707  promethazine (PHENERGAN) tablet 12.5 mg  Every 6 Hours PRN,   Status:  Discontinued        Placed in \"Or\" Linked Group    24 1707    24 1707  terbutaline (BRETHINE) injection 0.25 mg  As Needed,   Status:  Discontinued         24 1707    24 1707  morphine injection 1 mg  Every 4 Hours PRN,   Status:  Discontinued        Placed in \"And\" Linked Group    24 1707    24 1707  naloxone (NARCAN) injection 0.4 mg  Every 5 Minutes PRN,   Status:  Discontinued        Placed in \"And\" Linked Group    24 1707    24 1707  morphine injection 2 mg  Every 4 Hours PRN,   Status:  Discontinued        Placed in \"And\" Linked Group    24 1707    24 1707  naloxone (NARCAN) injection 0.4 mg  Every 5 Minutes PRN,   Status:  Discontinued        Placed in \"And\" Linked Group    24 1707    Unscheduled  Warm compress  As Needed       24    Unscheduled  Apply ace wrap, tight bra, or binder  As Needed       24    Unscheduled  Apply ice packs  As Needed       24                     Operative/Procedure Notes (last 48 hours)        Lisa Gifford DO at 24 1600           [facility]   Vaginal Delivery Note    Patient Name: Antonia Jurado  : 2005  MRN: [id number]    Date of Delivery: 3/4/2024     Diagnosis     Pre & Post-Delivery:  Intrauterine pregnancy at 39w3d  Labor status: Induced Onset of Labor     Pregnancy with 39 completed weeks " gestation             Problem List    Transfer to Postpartum     Review the Delivery Report for details.     Delivery     Delivery:      YOB: 2025    Time of Birth:  Gestational Age 3:37 PM   39w3d     Anesthesia: Epidural     Delivering clinician:     Forceps?   No   Vacuum? No    Shoulder dystocia present: No        Delivery narrative:  Patient admitted to L&D for eIOL. Connected to external FHT and TOCO on presentation to unit. SVE on intake confirmed that she was 4/80/-1. Patient was heather approximately every 5 minutes. FHT demonstrated initial Category I findings. IV Pitocin per protocol initiated. Patient received epidural and was comfortable with contractions. AROM with amnihook performed with return of clear fluid. Maternal and fetal tolerance of procedure.     Labor progressed steadily. Patient felt increasingly more painful with contractions. Patient progressed to complete and felt the urge to push. Patient was placed in dorsal lithotomy position with feet in stirrups bilaterally. Patient was instructed on pushing in concert with contractions. Patient delivered infant vertex in PABLO position. No nuchal cord was present. Anterior left shoulder delivered by gentle downward traction then followed by right posterior shoulder by gentle upward traction without complication. No shoulder dystocia. Body followed without difficulty. Male infant delivered pink, active, with good tone and was placed on maternal abdomen. Delayed cord clamping was performed for 1 minute while infant was stimulated and vigorous cry noted. Umbilical cord was doubly clamped and cut by friend of patient. Infant was attended to by labor and delivery and nursery nurse. Umbilical cord blood collected. Placenta delivered intact spontaneously without complication via gentle umbilical cord traction. IV Pitocin was started. Fundal massage was initiated and fundus was found to be firm. Blood and clots were cleared from the  "vaginal vault. The perineum, vaginal walls, cervix, and paraurethral area were inspected thoroughly. A second degree midline perineal laceration was noted. Laceration reapproximated with 3-0 Vicryl. Epidural was effective for patient comfort. Hemostasis noted. No episiotomy was performed. Placenta was observed following delivery. Cotyledons all intact and membranes complete. Thick 3 vessel cord confirmed. No complications. Mother and baby were stable  when leaving delivery room.     I was present for all portions of delivery as listed above.        Infant     Findings: male  infant     Infant observations: Weight: 3690 g (8 lb 2.2 oz)   Length: 20  in  Observations/Comments:        Apgars:   @ 1 minute /      @ 5 minutes   Infant Name: Jesus     Placenta & Cord         Placenta delivered  Spontaneous  at   3/4/2024  3:41 PM     Cord:   present.   Nuchal Cord?  no   Cord blood obtained:     Cord gases obtained:      Cord gas results: Venous:  No results found for: \"PHCVEN\", \"BECVEN\"    Arterial:  No results found for: \"PHCART\", \"BECART\"     Repair     Episiotomy: Not recorded     No    Lacerations: Yes  Laceration Information  Laceration Repaired?   Perineal:    yes   Periurethral:       Labial:       Sulcus:       Vaginal:       Cervical:         Suture used for repair: 3-0 Vicryl     Estimated Blood Loss:  250ml     Quantitative Blood Loss:          Complications     none    Disposition     Mother to Mother Baby/Postpartum  in stable condition currently.  Baby to NBN  in stable condition currently.    Lisa Gifford DO  03/04/24  16:05 EST          Electronically signed by Lisa Gifford DO at 03/04/24 1606          Physician Progress Notes (last 48 hours)        Lisa Gifford DO at 03/04/24 1237          03/04/24  12:37 EST  Antonia Jurado    SUBJECTIVE: comfortable with epidural     ASSESSMENTS:     /81   Pulse 100   Temp (P) 98.1 °F (36.7 °C) (Oral)   Resp (P) 18   Breastfeeding Yes "     Fetal Heart Rate Assessment   Method: Fetal HR Assessment Method: external   Beats/min: Fetal HR (beats/min): 125   Baseline: Fetal HR Baseline: normal range   Varibility: Fetal HR Variability: moderate (amplitude range 6 to 25 bpm)   Accels: Fetal HR Accelerations: greater than/equal to 15 bpm   Decels: Fetal HR Decelerations: absent   Tracing Category:       Uterine Assessment   Method: Method: external tocotransducer   Frequency (min): Contraction Frequency (Minutes): 2-3   Ctx Count in 10 min: Contractions in 10 Minutes: 2-3   Duration:     Intensity: Contraction Intensity: moderate by palpation   Intensity by IUPC:     Resting Tone: Uterine Resting Tone: soft by palpation   Resting Tone by IUPC:       Presentation: cephalic   Cervix: Exam by: Method: (P) sterile exam per RN   Dilation: Cervical Dilation (cm): (P) 5-6   Effacement: Cervical Effacement: (P) 90%   Station: Fetal Station: (P) -1            IUP at 39w3d   GBS +          PLAN:  -s/p epidural placement  -FHT Cat I   -SVE recheck 9/100/+1  -Proceed with IV PPP, maternal position changes  -Continue PCN for GBS ppx  -Will start pushing with complete cervical dilation       Lisa Gifford DO  12:37 EST  03/04/24        Electronically signed by Lisa Gifford DO at 03/04/24 1238       Lisa Gifford DO at 03/04/24 0615          03/04/24  11:11 EST  Antonia Jurado    SUBJECTIVE: comfortable     ASSESSMENTS:     /81   Pulse 100   Temp 98.3 °F (36.8 °C) (Oral)   Resp (P) 18   Breastfeeding Yes     Fetal Heart Rate Assessment   Method: Fetal HR Assessment Method: external   Beats/min: Fetal HR (beats/min): 120   Baseline: Fetal HR Baseline: normal range   Varibility: Fetal HR Variability: moderate (amplitude range 6 to 25 bpm)   Accels: Fetal HR Accelerations: greater than/equal to 15 bpm   Decels: Fetal HR Decelerations: absent   Tracing Category:       Uterine Assessment   Method: Method: external tocotransducer   Frequency  (min): Contraction Frequency (Minutes): 2-3   Ctx Count in 10 min: Contractions in 10 Minutes: 2-3   Duration:     Intensity: Contraction Intensity: moderate by palpation   Intensity by IUPC:     Resting Tone: Uterine Resting Tone: soft by palpation   Resting Tone by IUPC:       Presentation: cephalice   Cervix: Exam by: Method: (P) sterile exam per RN   Dilation: Cervical Dilation (cm): (P) 5-6   Effacement: Cervical Effacement: (P) 90%   Station: Fetal Station: (P) -1            IUP at 39w3d   GBS positive          PLAN:  -Comfortable  -SVE 4/90/-1  -PCN IV for GBS ppx  -AROM performed with return of clear fluid. Fetal and maternal tolerance of procedure.   -FHT Cat I  -Proceed with IV PPP    Lisa Gifford DO  11:11 EST  03/04/24        Electronically signed by Lisa Gifford DO at 03/04/24 1113       Lisa Gifford DO at 03/04/24 0605          03/04/24  06:05 EST  Antonia Jurado    SUBJECTIVE: comfortable     ASSESSMENTS:     /79   Pulse 73   Temp 98.6 °F (37 °C) (Oral)   Resp 16   Breastfeeding Yes     Fetal Heart Rate Assessment   Method: Fetal HR Assessment Method: external   Beats/min: Fetal HR (beats/min): 115   Baseline: Fetal HR Baseline: normal range   Varibility: Fetal HR Variability: moderate (amplitude range 6 to 25 bpm)   Accels: Fetal HR Accelerations: greater than/equal to 15 bpm, lasting at least 15 seconds   Decels: Fetal HR Decelerations: absent   Tracing Category:       Uterine Assessment   Method: Method: external tocotransducer   Frequency (min): Contraction Frequency (Minutes): 2-3   Ctx Count in 10 min: Contractions in 10 Minutes: 3   Duration:     Intensity: Contraction Intensity: moderate by palpation   Intensity by IUPC:     Resting Tone: Uterine Resting Tone: soft by palpation   Resting Tone by IUPC:       Presentation: cephalic   Cervix: Exam by: Method: sterile exam per RN   Dilation: Cervical Dilation (cm): 4   Effacement: Cervical Effacement: 80%    Station: Fetal Station: -1            IUP at 39w3d           PLAN:  -SVE 4/90/-1  -Dicussed AROM including R/B/I/A  -Patient agreeable  -AROM performed with amniohook without complications. Return of clear fluid.   -Maternal and fetal tolerance of procedure  -FHT Cat I   -Continue IV Pit PP    Lisa Gifford DO  06:05 EST  03/04/24        Electronically signed by Lisa Gifford DO at 03/04/24 0606          Date of Birth   2005    Social Security Number       Address   [address] [place] [address]    Home Phone   [phone]    MRN   [id number]       Hindu   None    Marital Status   Single                            Admission Date   3/3/24    Admission Type   Elective    Admitting Provider   Lisa Gifford DO    Attending Provider   Lisa Gifford DO    Department, Room/Bed   [facility]AB[facility] 4A, N406/1       Discharge Date       Discharge Disposition       Discharge Destination                                 Attending Provider: Lisa Gifford DO    Allergies: Morphine    Isolation: None   Infection: None   Code Status: CPR    Ht: --   Wt: --    Admission Cmt: None   Principal Problem: Pregnancy with 39 completed weeks gestation [Z3A.39]                 Active Insurance as of 3/3/2024       Primary Coverage       Payor Plan Insurance Group Employer/Plan Group    [place] MEDICAID [facility] MEDICAID [id number]       Payor Plan Address Payor Plan Phone Number Payor Plan Fax Number Effective Dates    [address] [address] [phone]  4/1/2021 - None Entered    [facility] [id number]         Subscriber Name Subscriber Birth Date Member ID       KATIA CELESTE 2005 [id number]                   Emergency Contacts        (Rel.) Home Phone Work Phone Mobile Phone    RAMSEY CELESTE (Father) -- -- [phone]    ANA CELESTE (Mother) -- -- [phone]

## 2024-03-05 NOTE — PROGRESS NOTES
3/5/2024  PPD #1    Subjective   Jolena feels well.  Patient describes her lochia as less than menses.  Pain is well controlled, breast feeding       Objective   Temp: Temp:  [97.9 °F (36.6 °C)-99.1 °F (37.3 °C)] 97.9 °F (36.6 °C) Temp src: Oral   BP: BP: (101-138)/(55-81) 117/58        Pulse: Heart Rate:  [] 89  RR: Resp:  [16-18] 16    General:  No acute distress   Abdomen: Fundus firm and beneath umbilicus   Pelvis: deferred     Lab Results   Component Value Date    WBC 14.28 (H) 03/05/2024    HGB 10.1 (L) 03/05/2024    HCT 31.7 (L) 03/05/2024    MCV 86.6 03/05/2024     03/05/2024    CREATININE 0.55 (L) 10/15/2022    AST 44 (H) 10/15/2022    ALT 60 (H) 10/15/2022    HEPBSAG Non-Reactive 08/29/2023     Results from last 7 days   Lab Units 03/03/24  1834   ABO TYPING  O   RH TYPING  Positive   ANTIBODY SCREEN  Negative       Assessment  PPD# 1 after vaginal delivery  Anemia    Plan  Supportive care, anticipate d/c in am Ferrous sulfate on discharge      This note has been electronically signed.    Sabi Werner CNM  08:25 EST  March 5, 2024

## 2024-03-06 VITALS
TEMPERATURE: 98.4 F | SYSTOLIC BLOOD PRESSURE: 121 MMHG | OXYGEN SATURATION: 99 % | RESPIRATION RATE: 18 BRPM | DIASTOLIC BLOOD PRESSURE: 55 MMHG | HEART RATE: 77 BPM

## 2024-03-06 PROBLEM — Z3A.39 PREGNANCY WITH 39 COMPLETED WEEKS GESTATION: Status: RESOLVED | Noted: 2024-03-03 | Resolved: 2024-03-06

## 2024-03-06 PROCEDURE — 63710000001 DIPHENHYDRAMINE PER 50 MG: Performed by: STUDENT IN AN ORGANIZED HEALTH CARE EDUCATION/TRAINING PROGRAM

## 2024-03-06 RX ORDER — FERROUS SULFATE 325(65) MG
325 TABLET ORAL
Qty: 30 TABLET | Refills: 0 | Status: SHIPPED | OUTPATIENT
Start: 2024-03-06

## 2024-03-06 RX ORDER — PSEUDOEPHEDRINE HCL 30 MG
100 TABLET ORAL 2 TIMES DAILY PRN
Qty: 60 CAPSULE | Refills: 1 | Status: SHIPPED | OUTPATIENT
Start: 2024-03-06

## 2024-03-06 RX ORDER — IBUPROFEN 600 MG/1
600 TABLET ORAL EVERY 6 HOURS PRN
Qty: 60 TABLET | Refills: 1 | Status: SHIPPED | OUTPATIENT
Start: 2024-03-06

## 2024-03-06 RX ADMIN — DIPHENHYDRAMINE HYDROCHLORIDE 25 MG: 25 CAPSULE ORAL at 00:34

## 2024-03-06 RX ADMIN — PRENATAL VITAMINS-IRON FUMARATE 27 MG IRON-FOLIC ACID 0.8 MG TABLET 1 TABLET: at 08:54

## 2024-03-06 RX ADMIN — DOCUSATE SODIUM 100 MG: 100 CAPSULE, LIQUID FILLED ORAL at 08:54

## 2024-03-06 RX ADMIN — SIMETHICONE 80 MG: 80 TABLET, CHEWABLE ORAL at 00:34

## 2024-03-06 RX ADMIN — IBUPROFEN 600 MG: 600 TABLET, FILM COATED ORAL at 07:40

## 2024-03-06 RX ADMIN — IBUPROFEN 600 MG: 600 TABLET, FILM COATED ORAL at 00:34

## 2024-03-06 RX ADMIN — FERROUS SULFATE TAB 325 MG (65 MG ELEMENTAL FE) 325 MG: 325 (65 FE) TAB at 08:54

## 2024-03-06 NOTE — DISCHARGE SUMMARY
[facility]  Vaginal delivery discharge summary      Patient: Antonia Jurado      MR#:[id number]  Admission  Diagnosis: Present on Admission:  **None**      Discharge Diagnosis: Active and Resolved Problems  Active Hospital Problems    Diagnosis  POA     (spontaneous vaginal delivery) [O80]  Not Applicable    Postpartum anemia [O90.81]  No      Resolved Hospital Problems    Diagnosis Date Resolved POA    **Pregnancy with 39 completed weeks gestation [Z3A.39] 2024 Not Applicable            Date of Admission: 3/3/2024  Date of Discharge:  3/6/2024    Procedures:  Vaginal, Spontaneous     3/4/2024    3:37 PM      Service:  Obstetrics    Hospital Course:  Patient underwent vaginal delivery and remained in the hospital for 3 days.  During that time she remained afebrile and hemodynamically stable.  On the day of discharge, she was eating, ambulating and voiding without difficulty.  She is breastfeeding.     Labs:    Lab Results   Component Value Date    WBC 14.28 (H) 2024    HGB 10.1 (L) 2024    HCT 31.7 (L) 2024    MCV 86.6 2024     2024    CREATININE 0.55 (L) 10/15/2022    AST 44 (H) 10/15/2022    ALT 60 (H) 10/15/2022     Results from last 7 days   Lab Units 24  1834   ABO TYPING  O   RH TYPING  Positive   ANTIBODY SCREEN  Negative            Discharge Medications        New Medications        Instructions Start Date   docusate sodium 100 MG capsule   100 mg, Oral, 2 Times Daily PRN      ferrous sulfate 325 (65 FE) MG tablet   325 mg, Oral, Daily With Breakfast      ibuprofen 600 MG tablet  Commonly known as: ADVIL,MOTRIN   600 mg, Oral, Every 6 Hours PRN             Continue These Medications        Instructions Start Date   Prenatal 27-1 27-1 MG tablet tablet   1 tablet, Oral, Daily               Discharge Disposition:  To Home    Discharge Condition:  Stable. PP anemia taking ferrous sulfate.     Discharge Diet: Regular    Activity at Discharge: Pelvic  rest    Follow-up Appointments  Follow up with LW in 6 weeks.     Marry Sawyer CNM  03/06/24  08:55 EST

## 2024-03-06 NOTE — PLAN OF CARE
Goal Outcome Evaluation:  Plan of Care Reviewed With: (P) patient   Patient DC today, will follow up with [facility] in six weeks. Tolerating pain well with oral meds. Performing all ADLs independently.      Progress: (P) improving

## 2024-03-06 NOTE — PROGRESS NOTES
3/6/2024  PPD #2    Subjective   Jolena feels well.  Patient describes her lochia as less than menses.  Pain is well controlled  She is breastfeeding.      Objective   Temp: Temp:  [98.3 °F (36.8 °C)-98.5 °F (36.9 °C)] 98.4 °F (36.9 °C) Temp src: Oral   BP: BP: (121-127)/(55-74) 121/55        Pulse: Heart Rate:  [77-83] 77  RR: Resp:  [16-18] 18    General:  No acute distress   Abdomen: Fundus firm and beneath umbilicus   Pelvis: deferred     Lab Results   Component Value Date    WBC 14.28 (H) 03/05/2024    HGB 10.1 (L) 03/05/2024    HCT 31.7 (L) 03/05/2024    MCV 86.6 03/05/2024     03/05/2024    ABORH O Positive 10/16/2022    HEPBSAG Non-Reactive 08/29/2023    AST 44 (H) 10/15/2022    ALT 60 (H) 10/15/2022       Assessment  PPD# 2 after vaginal delivery  2.   Anemia 2/2 acute blood loss    Plan  Discharge to home  Follow up with LW  in 6 weeks  Prescriptions for Motrin, ferrous sulfate, and Colace prescribed and advised on weaning.  Advised no tampons, intercourse, or tub baths for 6 weeks.       This note has been electronically signed.    Marry Sawyer CNM  08:55 EST  March 6, 2024

## 2024-03-13 ENCOUNTER — MATERNAL SCREENING (OUTPATIENT)
Dept: CALL CENTER | Facility: HOSPITAL | Age: 19
End: 2024-03-13
Payer: MEDICAID

## 2024-03-13 NOTE — OUTREACH NOTE
Maternal Screening Survey      Flowsheet Row Responses   Facility patient discharged from? [facility]   Attempt successful? Yes   Call start time 1000   Call end time 1005   Person spoke with today (if not patient) and relationship 1005   EPD Scale: Able to Laugh 0-->as much as she always could   EPD Scale: Looked Forward 0-->as much as she ever did   EPD Scale: Blamed Self 0-->no, never   EPD Scale: Been Anxious 0-->no, not at all   EPD Scale: Felt Panicky 0-->no, not at all   EPD Scale: Things Getting on Top 0-->no, has been coping as well as ever   EPD Scale: Difficulty Sleeping 0-->no, not at all   EPD Scale: Sad or Miserable 0-->no, not at all   EPD Scale: Crying 0-->no, never   EPD Scale: Thought of Harming Self 0-->never   [place]  Depression Score 0   Did any of your parents have problems with alcohol or drug use? No   Do any of your peers have problems with alcohol or drug use? No   Does your partner have problems with alcohol or drug use? No   Before you were pregnant did you have problems with alcohol or drug use? (past) No   In the past month, did you drink beer, wine, liquor or use any other drugs? (pregnancy) No   Maternal Screening call completed Yes   Call end time 1005              Thai WEISS - Registered Nurse

## 2024-08-01 ENCOUNTER — OFFICE VISIT (OUTPATIENT)
Dept: FAMILY MEDICINE CLINIC | Facility: CLINIC | Age: 19
End: 2024-08-01
Payer: MEDICAID

## 2024-08-01 VITALS
BODY MASS INDEX: 26.16 KG/M2 | WEIGHT: 162.8 LBS | SYSTOLIC BLOOD PRESSURE: 126 MMHG | HEIGHT: 66 IN | HEART RATE: 63 BPM | DIASTOLIC BLOOD PRESSURE: 82 MMHG | OXYGEN SATURATION: 100 %

## 2024-08-01 DIAGNOSIS — R53.83 FATIGUE, UNSPECIFIED TYPE: ICD-10-CM

## 2024-08-01 DIAGNOSIS — N28.9 RENAL INSUFFICIENCY: ICD-10-CM

## 2024-08-01 DIAGNOSIS — Q61.3 POLYCYSTIC KIDNEY DISEASE: Primary | ICD-10-CM

## 2024-08-01 DIAGNOSIS — Z13.1 SCREENING FOR DIABETES MELLITUS: ICD-10-CM

## 2024-08-01 PROCEDURE — 1160F RVW MEDS BY RX/DR IN RCRD: CPT | Performed by: FAMILY MEDICINE

## 2024-08-01 PROCEDURE — 1159F MED LIST DOCD IN RCRD: CPT | Performed by: FAMILY MEDICINE

## 2024-08-01 PROCEDURE — 99203 OFFICE O/P NEW LOW 30 MIN: CPT | Performed by: FAMILY MEDICINE

## 2024-08-01 RX ORDER — NORGESTREL 0.07 MG/1
0.07 TABLET ORAL DAILY
COMMUNITY

## 2024-08-01 NOTE — PROGRESS NOTES
New Patient Office Visit      Patient Name: Antonia Jurado  : 2005   MRN: [id number]     Chief Complaint:    Chief Complaint   Patient presents with    [facility] [place] Care    Annual Exam     Patient requests referral for kidney specialist for polycystic kidney disease       History of Present Illness: Antonia Jurado is a 19 y.o. female who is here today to  Discuss fatigue and a referral.  Patient has a history of polycystic kidney disease/renal insufficiency and is requesting to establish with a nephrologist here in [place] as she has just moved here from [place].  Her previous nephrologist was at .  Patient also complains of fatigue and is requesting lab evaluation.  She denies any other complaints.    Subjective      Review of Systems:   Review of Systems   Constitutional:  Positive for fatigue.   All other systems reviewed and are negative.      Past Medical History:   Past Medical History:   Diagnosis Date    ADHD (attention deficit hyperactivity disorder)     Anxiety     Bipolar 2 disorder     Depression     Polycystic kidney disease     Renal insufficiency     ADPKD       Past Surgical History: History reviewed. No pertinent surgical history.    Family History:   Family History   Problem Relation Age of Onset    Alcohol abuse Mother     Depression Mother     Drug abuse Mother     Mental illness Mother     Anxiety disorder Father     Kidney disease Maternal Grandmother     Diabetes Paternal Grandfather     Hypertension Paternal Grandfather     Kidney disease Maternal Uncle        Social History:   Social History     Socioeconomic History    Marital status: Single   Tobacco Use    Smoking status: Never    Smokeless tobacco: Never   Vaping Use    Vaping status: Every Day    Substances: Nicotine, Flavoring    Devices: Disposable   Substance and Sexual Activity    Alcohol use: Never    Drug use: Never    Sexual activity: Yes     Partners: Male     Birth control/protection: Birth control  "pill       Medications:     Current Outpatient Medications:     Norgestrel (Opill) 0.075 MG tablet, Take 0.075 mg by mouth Daily., Disp: , Rfl:     Allergies:   Allergies   Allergen Reactions    Morphine Hives       Objective     Physical Exam:  Vital Signs:   Vitals:    08/01/24 1323   BP: 126/82   BP Location: Left arm   Patient Position: Sitting   Cuff Size: Adult   Pulse: 63   SpO2: 100%   Weight: 73.8 kg (162 lb 12.8 oz)   Height: 167.6 cm (66\")     Body mass index is 26.28 kg/m².   Facility age limit for growth %yanely is 20 years.    Physical Exam  Vitals and nursing note reviewed.   Constitutional:       General: She is not in acute distress.     Appearance: Normal appearance. She is not ill-appearing.   HENT:      Head: Normocephalic and atraumatic.      Right Ear: Tympanic membrane normal.      Left Ear: Tympanic membrane normal.      Mouth/Throat:      Pharynx: Oropharynx is clear.   Cardiovascular:      Rate and Rhythm: Normal rate and regular rhythm.   Pulmonary:      Effort: Pulmonary effort is normal.      Breath sounds: Normal breath sounds.   Neurological:      General: No focal deficit present.      Mental Status: She is alert.         Procedures    PHQ-9 Total Score:       Assessment / Plan      Assessment/Plan:   Assessment & Plan  Polycystic kidney disease    Renal insufficiency    Fatigue, unspecified type    Screening for diabetes mellitus      Orders Placed This Encounter   Procedures    Hemoglobin A1c    Vitamin B12    CBC Auto Differential    Comprehensive Metabolic Panel    TSH    Iron Profile    Ambulatory Referral to Nephrology        Will refer to nephrology here in [place] per patient request.  Will do lab evaluation for fatigue.  Will have patient follow-up in 2 weeks to discuss lab work and consider further workup of fatigue if symptoms persist.             Follow Up:   Return in about 2 weeks (around 8/15/2024).      DAXA Shane MD  [facility]  "

## 2024-08-02 LAB
ALBUMIN SERPL-MCNC: 4.9 G/DL (ref 4–5)
ALP SERPL-CCNC: 75 IU/L (ref 42–106)
ALT SERPL-CCNC: 23 IU/L (ref 0–32)
AST SERPL-CCNC: 25 IU/L (ref 0–40)
BASOPHILS # BLD AUTO: 0.1 X10E3/UL (ref 0–0.2)
BASOPHILS NFR BLD AUTO: 1 %
BILIRUB SERPL-MCNC: 0.4 MG/DL (ref 0–1.2)
BUN SERPL-MCNC: 9 MG/DL (ref 6–20)
BUN/CREAT SERPL: 13 (ref 9–23)
CALCIUM SERPL-MCNC: 10.3 MG/DL (ref 8.7–10.2)
CHLORIDE SERPL-SCNC: 103 MMOL/L (ref 96–106)
CO2 SERPL-SCNC: 23 MMOL/L (ref 20–29)
CREAT SERPL-MCNC: 0.71 MG/DL (ref 0.57–1)
EGFRCR SERPLBLD CKD-EPI 2021: 125 ML/MIN/1.73
EOSINOPHIL # BLD AUTO: 0.3 X10E3/UL (ref 0–0.4)
EOSINOPHIL NFR BLD AUTO: 3 %
ERYTHROCYTE [DISTWIDTH] IN BLOOD BY AUTOMATED COUNT: 12.6 % (ref 11.7–15.4)
GLOBULIN SER CALC-MCNC: 2.7 G/DL (ref 1.5–4.5)
GLUCOSE SERPL-MCNC: 92 MG/DL (ref 70–99)
HBA1C MFR BLD: 5.5 % (ref 4.8–5.6)
HCT VFR BLD AUTO: 44 % (ref 34–46.6)
HGB BLD-MCNC: 14 G/DL (ref 11.1–15.9)
IMM GRANULOCYTES # BLD AUTO: 0 X10E3/UL (ref 0–0.1)
IMM GRANULOCYTES NFR BLD AUTO: 0 %
IRON SATN MFR SERPL: 25 % (ref 15–55)
IRON SERPL-MCNC: 89 UG/DL (ref 27–159)
LYMPHOCYTES # BLD AUTO: 2.3 X10E3/UL (ref 0.7–3.1)
LYMPHOCYTES NFR BLD AUTO: 31 %
MCH RBC QN AUTO: 29.2 PG (ref 26.6–33)
MCHC RBC AUTO-ENTMCNC: 31.8 G/DL (ref 31.5–35.7)
MCV RBC AUTO: 92 FL (ref 79–97)
MONOCYTES # BLD AUTO: 0.5 X10E3/UL (ref 0.1–0.9)
MONOCYTES NFR BLD AUTO: 6 %
NEUTROPHILS # BLD AUTO: 4.3 X10E3/UL (ref 1.4–7)
NEUTROPHILS NFR BLD AUTO: 59 %
PLATELET # BLD AUTO: 380 X10E3/UL (ref 150–450)
POTASSIUM SERPL-SCNC: 4 MMOL/L (ref 3.5–5.2)
PROT SERPL-MCNC: 7.6 G/DL (ref 6–8.5)
RBC # BLD AUTO: 4.8 X10E6/UL (ref 3.77–5.28)
SODIUM SERPL-SCNC: 141 MMOL/L (ref 134–144)
TIBC SERPL-MCNC: 353 UG/DL (ref 250–450)
TSH SERPL DL<=0.005 MIU/L-ACNC: 0.74 UIU/ML (ref 0.45–4.5)
UIBC SERPL-MCNC: 264 UG/DL (ref 131–425)
VIT B12 SERPL-MCNC: 321 PG/ML (ref 232–1245)
WBC # BLD AUTO: 7.3 X10E3/UL (ref 3.4–10.8)